# Patient Record
Sex: FEMALE | Race: ASIAN | NOT HISPANIC OR LATINO
[De-identification: names, ages, dates, MRNs, and addresses within clinical notes are randomized per-mention and may not be internally consistent; named-entity substitution may affect disease eponyms.]

---

## 2018-01-08 ENCOUNTER — CHARTING TRANS (OUTPATIENT)
Dept: OTHER | Age: 38
End: 2018-01-08

## 2018-06-07 PROBLEM — M77.11 LATERAL EPICONDYLITIS, RIGHT ELBOW: Status: ACTIVE | Noted: 2018-06-07

## 2018-11-19 PROCEDURE — 83520 IMMUNOASSAY QUANT NOS NONAB: CPT | Performed by: OBSTETRICS & GYNECOLOGY

## 2018-11-20 PROBLEM — M77.11 LATERAL EPICONDYLITIS, RIGHT ELBOW: Status: RESOLVED | Noted: 2018-06-07 | Resolved: 2018-11-20

## 2019-03-13 PROCEDURE — 88175 CYTOPATH C/V AUTO FLUID REDO: CPT | Performed by: OBSTETRICS & GYNECOLOGY

## 2019-03-13 PROCEDURE — 87624 HPV HI-RISK TYP POOLED RSLT: CPT | Performed by: OBSTETRICS & GYNECOLOGY

## 2020-05-13 PROBLEM — U07.1 COVID-19 VIRUS INFECTION: Status: ACTIVE | Noted: 2020-05-13

## 2020-05-13 PROBLEM — E78.00 HYPERCHOLESTEROLEMIA: Status: ACTIVE | Noted: 2020-05-13

## 2021-05-11 ENCOUNTER — LAB ENCOUNTER (OUTPATIENT)
Dept: LAB | Age: 41
End: 2021-05-11
Attending: EMERGENCY MEDICINE
Payer: COMMERCIAL

## 2021-05-11 ENCOUNTER — OFFICE VISIT (OUTPATIENT)
Dept: FAMILY MEDICINE CLINIC | Facility: CLINIC | Age: 41
End: 2021-05-11
Payer: COMMERCIAL

## 2021-05-11 VITALS
DIASTOLIC BLOOD PRESSURE: 82 MMHG | OXYGEN SATURATION: 98 % | HEIGHT: 62 IN | TEMPERATURE: 98 F | WEIGHT: 133 LBS | BODY MASS INDEX: 24.48 KG/M2 | RESPIRATION RATE: 15 BRPM | HEART RATE: 80 BPM | SYSTOLIC BLOOD PRESSURE: 122 MMHG

## 2021-05-11 DIAGNOSIS — R73.03 PREDIABETES: ICD-10-CM

## 2021-05-11 DIAGNOSIS — E78.00 HYPERCHOLESTEROLEMIA: ICD-10-CM

## 2021-05-11 DIAGNOSIS — N97.9 INABILITY TO CONCEIVE, FEMALE: ICD-10-CM

## 2021-05-11 DIAGNOSIS — Z13.21 SCREENING FOR ENDOCRINE, NUTRITIONAL, METABOLIC AND IMMUNITY DISORDER: ICD-10-CM

## 2021-05-11 DIAGNOSIS — K21.9 CHRONIC GERD: ICD-10-CM

## 2021-05-11 DIAGNOSIS — Z13.228 SCREENING FOR ENDOCRINE, NUTRITIONAL, METABOLIC AND IMMUNITY DISORDER: ICD-10-CM

## 2021-05-11 DIAGNOSIS — Z00.00 ENCOUNTER FOR ANNUAL PHYSICAL EXAMINATION EXCLUDING GYNECOLOGICAL EXAMINATION IN A PATIENT OLDER THAN 17 YEARS: Primary | ICD-10-CM

## 2021-05-11 DIAGNOSIS — Z13.0 SCREENING FOR ENDOCRINE, NUTRITIONAL, METABOLIC AND IMMUNITY DISORDER: ICD-10-CM

## 2021-05-11 DIAGNOSIS — Z12.31 ENCOUNTER FOR SCREENING MAMMOGRAM FOR BREAST CANCER: ICD-10-CM

## 2021-05-11 DIAGNOSIS — Z23 NEED FOR TDAP VACCINATION: ICD-10-CM

## 2021-05-11 DIAGNOSIS — Z13.29 SCREENING FOR ENDOCRINE, NUTRITIONAL, METABOLIC AND IMMUNITY DISORDER: ICD-10-CM

## 2021-05-11 PROCEDURE — 3074F SYST BP LT 130 MM HG: CPT | Performed by: EMERGENCY MEDICINE

## 2021-05-11 PROCEDURE — 99386 PREV VISIT NEW AGE 40-64: CPT | Performed by: EMERGENCY MEDICINE

## 2021-05-11 PROCEDURE — 90471 IMMUNIZATION ADMIN: CPT | Performed by: EMERGENCY MEDICINE

## 2021-05-11 PROCEDURE — 83036 HEMOGLOBIN GLYCOSYLATED A1C: CPT

## 2021-05-11 PROCEDURE — 90715 TDAP VACCINE 7 YRS/> IM: CPT | Performed by: EMERGENCY MEDICINE

## 2021-05-11 PROCEDURE — 85025 COMPLETE CBC W/AUTO DIFF WBC: CPT

## 2021-05-11 PROCEDURE — 80061 LIPID PANEL: CPT

## 2021-05-11 PROCEDURE — 36415 COLL VENOUS BLD VENIPUNCTURE: CPT

## 2021-05-11 PROCEDURE — 84443 ASSAY THYROID STIM HORMONE: CPT

## 2021-05-11 PROCEDURE — 3008F BODY MASS INDEX DOCD: CPT | Performed by: EMERGENCY MEDICINE

## 2021-05-11 PROCEDURE — 3079F DIAST BP 80-89 MM HG: CPT | Performed by: EMERGENCY MEDICINE

## 2021-05-11 NOTE — PROGRESS NOTES
Zigmund Mcardle is a 38year old female who presents for a complete physical exam.   HPI:     Patient presents with:  Physical: NP, annual physical         Age: 38    1First day of last menstrual period (or first year of         menstruation, if through you  had your cholesterol level checked  in the past five years? NO       e. Have you had a tetanus shot  the past 10 years? NO       f. Does your house have a working smoke detector? YES         g. Do you have firearms at home? YES NO        h.  Have y Diabetes Father    • Hypertension Father    • Diabetes Sister    • Diabetes Brother       Social History:   Social History    Tobacco Use      Smoking status: Never Smoker      Smokeless tobacco: Never Used    Vaping Use      Vaping Use: Never used    Alco nerves II-XII normal,no focal abnormalities, and reflexes coordination and gait normal and symmetric. Sensation intact. Extremities: are symmetric with no cyanosis, clubbing, or edema. MS: Normal muscles tones, no joints abnormalities.   SKIN: Normal color and agrees to the plan. The patient is asked  to return yearly for annual preventative health exam.    Well balanced diet recommended. Routine exercise recommended most days during the week.   Wear sunscreen - SPF 15 or higher and reapply every 2 hours

## 2021-05-11 NOTE — PATIENT INSTRUCTIONS
Thank you for choosing [facility]  To Do:  FOR [facility]        1. Have blood tests  2. Arrange for mammogram  3. Follow up yearly or as needed  4. Have blood tests done  5. Continue with Nexium daily  6.  Follow up with Gi for possible end Other sources      Source   Calcium (mg) per serving   Source   Calcium (mg) per serving   Source   Calcium (mg) per serving      Low-fat yogurt, plain   415 mg/8 oz.   Sardines, Atlantic, canned, with bones   351 mg/3 oz.   Oatmeal, instant, fortified   2 3 years   Alcohol misuse All women in this age group At routine exams   Blood pressure All women in this age group Every 2 years if your blood pressure is less than 120/80 mm Hg; yearly if your systolic blood pressure is 120 to 139 mm Hg, or your diastolic dose   Hepatitis A Women at increased risk for infection – talk with your healthcare provider 2 doses given 6 months apart   Hepatitis B Women at increased risk for infection – talk with your healthcare provider 3 doses over 6 months; second dose should be Last Reviewed: 8/27/2015  © 6653-8602 [address], [address] [place], [place] [address]. All rights reserved. This information is not intended as a substitute for professional medical care.  Always follow your healthcare professional's in

## 2021-05-12 ENCOUNTER — TELEPHONE (OUTPATIENT)
Dept: FAMILY MEDICINE CLINIC | Facility: CLINIC | Age: 41
End: 2021-05-12

## 2021-05-12 NOTE — TELEPHONE ENCOUNTER
----- Message from Alcon Levine MD sent at 5/11/2021  5:45 PM CDT -----  Needs OV for discussion of elevated HBA1C and cholesterol

## 2021-05-25 ENCOUNTER — OFFICE VISIT (OUTPATIENT)
Dept: FAMILY MEDICINE CLINIC | Facility: CLINIC | Age: 41
End: 2021-05-25
Payer: COMMERCIAL

## 2021-05-25 VITALS
HEART RATE: 83 BPM | RESPIRATION RATE: 15 BRPM | OXYGEN SATURATION: 98 % | TEMPERATURE: 98 F | DIASTOLIC BLOOD PRESSURE: 78 MMHG | BODY MASS INDEX: 24 KG/M2 | WEIGHT: 132 LBS | SYSTOLIC BLOOD PRESSURE: 120 MMHG

## 2021-05-25 DIAGNOSIS — E78.00 HYPERCHOLESTEROLEMIA: Primary | ICD-10-CM

## 2021-05-25 DIAGNOSIS — R73.03 PREDIABETES: ICD-10-CM

## 2021-05-25 PROCEDURE — 3078F DIAST BP <80 MM HG: CPT | Performed by: EMERGENCY MEDICINE

## 2021-05-25 PROCEDURE — 99213 OFFICE O/P EST LOW 20 MIN: CPT | Performed by: EMERGENCY MEDICINE

## 2021-05-25 PROCEDURE — 3074F SYST BP LT 130 MM HG: CPT | Performed by: EMERGENCY MEDICINE

## 2021-05-25 NOTE — PROGRESS NOTES
Chief Complaint:   Patient presents with:  Cholesterol: F/u     HPI:   This is a 38year old female         Patient here for review of laboratories. Recent labs showed cholesterol that was high with LDL elevated 171. Hemoglobin A1c 6.7.   Denies any his groomed.   GENERAL: well developed, well nourished, well hydrated, no distress  SKIN: good skin turgor, no obvious rashes      Recent Results (from the past 672 hour(s))   TSH W REFLEX TO FREE T4    Collection Time: 05/11/21 11:01 AM   Result Value Ref Laverne Cartagena improving her dietary choices. Refuses referral to dietitian for nutritional guidance. Encouraged weight loss and regular exercise. Patient instructed to follow-up in 3 months for recheck. Repeat blood test in 3 months.   Follow-up sooner if with any ot

## 2021-05-25 NOTE — PATIENT INSTRUCTIONS
Thank you for choosing [facility]  To Do:  FOR Carol DODD        1. Follow up in 3 months  2.  Repeat blood linda in 3 months                      High Cholesterol: Assessing Your Risk  High cholesterol level is one of the big risk factor f old or older? · Blood pressure. Do you have high blood pressure? Do you take medicine to treat high blood pressure? · Cholesterol levels. Have you been told you have high cholesterol? Do you take medicine to control your cholesterol?  Have your LDL-C (\"b your lifetime risk if you are age 21 to 44. Depending on these results, your provider may talk with you about other conditions that can influence your risk and affect your treatment decisions.  The following things can affect your risk:  · A family have a LDL (“bad cholesterol”). Cholesterol test results are most often shown as the total of HDL and LDL cholesterol numbers. You may also be told the separate HDL and LDL cholesterol results.   The optimal level of LDL has changed over time and depends on your r for professional medical care. Always follow your healthcare professional's instructions. Understanding Your Cholesterol Numbers     Blood flows easily when arteries are clear. Less blood flows when cholesterol builds up in artery walls.    The for health problems. My total cholesterol is: ________________   Your lipid numbers  Total cholesterol is just one part of the story. Cholesterol is made up of different kinds of fats (lipids).  If your total cholesterol is high, knowing your lipid profile peripheral artery disease. If your cholesterol levels are higher than normal, your healthcare provider will help you with steps to take to lower your levels. Steps may include lifestyle changes such as diet, physical activity, and quitting smoking.  Your pr changes you may need to make. Your provider may recommend that you see a registered dietitian for help with diet changes. Changes may include:   · Cutting back on the amount of fat and cholesterol in your meals  · Eating less salt (sodium).  This is especia more easily than adding exercise. Quitting smoking  Smoking and other tobacco use can raise cholesterol and make it harder to control. Quitting is tough. But millions of people have given up tobacco for good. You can quit, too!  Think about some of the re groups  Some people may need to take medicines called statins to control their cholesterol. This is in addition to eating a healthy diet and getting regular exercise. The dose or intensity of the statin depends on your risk factors.    Statins can help you [address]. All rights reserved. This information is not intended as a substitute for professional medical care. Always follow your healthcare professional's instructions. Controlling Your Cholesterol  Cholesterol is a waxy substance.  It travels i such as salmon, herring, sardines or mackerel per week . Most fish contain omega-3 fatty acids. These help lower total blood cholesterol. Omega-3 fatty acids lowers triglyceride levels, another form of fat in the blood.  If you are pregnant or thinking of b instructions. Prediabetes  You have been diagnosed with prediabetes. This means that the level of sugar (glucose) in your blood is too high. If you have prediabetes, you are at risk for developing type 2 diabetes.  Type 2 diabetes is diagnosed when t or lower. Prediabetes is 100 mg/dL to 125 mg/dL. Diabetes is 126 mg/dL or higher. · Glucose tolerance test. Your blood sugar is measured before and after you drink a very sugary liquid.  A normal test result is 139 milligrams per deciliter (mg/dL) or lower the time  · Have to urinate often  · Lose weight for no reason  · Feel numbness or tingling in your fingers or toes  · Have cuts or bruises that don’t seem to heal  · Have blurry vision  Linda last reviewed this educational content on 6/1/2019  © 2000-2 high in fiber can help you keep your cholesterol down.  Good sources of fiber are:  · Oats  · Barley  · Whole grains  · Beans  · Vegetables  · Cornmeal  · Popcorn  · Berries, apples, and other fruits  Linda last reviewed this educational content on 11/1/ stop what you’re doing, take your nitroglycerin, and call your doctor. Linda last reviewed this educational content on 6/1/2019  © 7661-2744 [employer] [address]. All rights reserved.  This information is not intended as a substitute for profession

## 2022-01-04 ENCOUNTER — APPOINTMENT (OUTPATIENT)
Dept: OTHER | Facility: HOSPITAL | Age: 42
End: 2022-01-04
Attending: PREVENTIVE MEDICINE

## 2022-05-20 ENCOUNTER — OFFICE VISIT (OUTPATIENT)
Dept: FAMILY MEDICINE CLINIC | Facility: CLINIC | Age: 42
End: 2022-05-20
Payer: COMMERCIAL

## 2022-05-20 VITALS
DIASTOLIC BLOOD PRESSURE: 94 MMHG | OXYGEN SATURATION: 99 % | BODY MASS INDEX: 23.92 KG/M2 | SYSTOLIC BLOOD PRESSURE: 149 MMHG | WEIGHT: 135 LBS | RESPIRATION RATE: 18 BRPM | HEART RATE: 96 BPM | HEIGHT: 63 IN | TEMPERATURE: 98 F

## 2022-05-20 DIAGNOSIS — J01.40 ACUTE PANSINUSITIS, RECURRENCE NOT SPECIFIED: Primary | ICD-10-CM

## 2022-05-20 PROCEDURE — 3080F DIAST BP >= 90 MM HG: CPT | Performed by: PHYSICIAN ASSISTANT

## 2022-05-20 PROCEDURE — 3077F SYST BP >= 140 MM HG: CPT | Performed by: PHYSICIAN ASSISTANT

## 2022-05-20 PROCEDURE — 99213 OFFICE O/P EST LOW 20 MIN: CPT | Performed by: PHYSICIAN ASSISTANT

## 2022-05-20 PROCEDURE — 3008F BODY MASS INDEX DOCD: CPT | Performed by: PHYSICIAN ASSISTANT

## 2022-05-20 RX ORDER — FLUTICASONE PROPIONATE 50 MCG
2 SPRAY, SUSPENSION (ML) NASAL DAILY
Qty: 1 EACH | Refills: 0 | Status: SHIPPED | OUTPATIENT
Start: 2022-05-20 | End: 2022-05-20

## 2022-05-20 RX ORDER — FLUTICASONE PROPIONATE 50 MCG
SPRAY, SUSPENSION (ML) NASAL
Qty: 48 G | Refills: 0 | Status: SHIPPED | OUTPATIENT
Start: 2022-05-20

## 2022-05-20 RX ORDER — CEFDINIR 300 MG/1
300 CAPSULE ORAL 2 TIMES DAILY
Qty: 14 CAPSULE | Refills: 0 | Status: SHIPPED | OUTPATIENT
Start: 2022-05-20 | End: 2022-05-27

## 2022-06-14 ENCOUNTER — OFFICE VISIT (OUTPATIENT)
Dept: FAMILY MEDICINE CLINIC | Facility: CLINIC | Age: 42
End: 2022-06-14
Payer: COMMERCIAL

## 2022-06-14 ENCOUNTER — LAB ENCOUNTER (OUTPATIENT)
Dept: LAB | Age: 42
End: 2022-06-14
Attending: EMERGENCY MEDICINE
Payer: COMMERCIAL

## 2022-06-14 VITALS
SYSTOLIC BLOOD PRESSURE: 114 MMHG | BODY MASS INDEX: 24.1 KG/M2 | WEIGHT: 136 LBS | HEART RATE: 88 BPM | RESPIRATION RATE: 15 BRPM | OXYGEN SATURATION: 97 % | DIASTOLIC BLOOD PRESSURE: 82 MMHG | HEIGHT: 63 IN

## 2022-06-14 DIAGNOSIS — Z00.00 LABORATORY EXAMINATION ORDERED AS PART OF A ROUTINE GENERAL MEDICAL EXAMINATION: ICD-10-CM

## 2022-06-14 DIAGNOSIS — K21.9 CHRONIC GERD: ICD-10-CM

## 2022-06-14 DIAGNOSIS — Z12.31 ENCOUNTER FOR SCREENING MAMMOGRAM FOR MALIGNANT NEOPLASM OF BREAST: ICD-10-CM

## 2022-06-14 DIAGNOSIS — E04.1 THYROID NODULE: ICD-10-CM

## 2022-06-14 DIAGNOSIS — Z00.00 ENCOUNTER FOR ANNUAL PHYSICAL EXAMINATION EXCLUDING GYNECOLOGICAL EXAMINATION IN A PATIENT OLDER THAN 17 YEARS: Primary | ICD-10-CM

## 2022-06-14 LAB
ALBUMIN SERPL-MCNC: 3.8 G/DL (ref 3.4–5)
ALBUMIN/GLOB SERPL: 1.1 {RATIO} (ref 1–2)
ALP LIVER SERPL-CCNC: 70 U/L
ALT SERPL-CCNC: 32 U/L
ANION GAP SERPL CALC-SCNC: 8 MMOL/L (ref 0–18)
AST SERPL-CCNC: 26 U/L (ref 15–37)
BASOPHILS # BLD AUTO: 0.06 X10(3) UL (ref 0–0.2)
BASOPHILS NFR BLD AUTO: 0.9 %
BILIRUB SERPL-MCNC: 0.5 MG/DL (ref 0.1–2)
BUN BLD-MCNC: 10 MG/DL (ref 7–18)
CALCIUM BLD-MCNC: 9.1 MG/DL (ref 8.5–10.1)
CHLORIDE SERPL-SCNC: 108 MMOL/L (ref 98–112)
CHOLEST SERPL-MCNC: 255 MG/DL (ref ?–200)
CO2 SERPL-SCNC: 22 MMOL/L (ref 21–32)
CREAT BLD-MCNC: 0.77 MG/DL
EOSINOPHIL # BLD AUTO: 0.11 X10(3) UL (ref 0–0.7)
EOSINOPHIL NFR BLD AUTO: 1.6 %
ERYTHROCYTE [DISTWIDTH] IN BLOOD BY AUTOMATED COUNT: 12.6 %
FASTING PATIENT LIPID ANSWER: YES
FASTING STATUS PATIENT QL REPORTED: YES
GLOBULIN PLAS-MCNC: 3.5 G/DL (ref 2.8–4.4)
GLUCOSE BLD-MCNC: 100 MG/DL (ref 70–99)
HCT VFR BLD AUTO: 40.9 %
HDLC SERPL-MCNC: 70 MG/DL (ref 40–59)
HGB BLD-MCNC: 13.2 G/DL
IMM GRANULOCYTES # BLD AUTO: 0.02 X10(3) UL (ref 0–1)
IMM GRANULOCYTES NFR BLD: 0.3 %
LDLC SERPL CALC-MCNC: 170 MG/DL (ref ?–100)
LYMPHOCYTES # BLD AUTO: 1.8 X10(3) UL (ref 1–4)
LYMPHOCYTES NFR BLD AUTO: 26.7 %
MCH RBC QN AUTO: 30.3 PG (ref 26–34)
MCHC RBC AUTO-ENTMCNC: 32.3 G/DL (ref 31–37)
MCV RBC AUTO: 93.8 FL
MONOCYTES # BLD AUTO: 0.51 X10(3) UL (ref 0.1–1)
MONOCYTES NFR BLD AUTO: 7.6 %
NEUTROPHILS # BLD AUTO: 4.24 X10 (3) UL (ref 1.5–7.7)
NEUTROPHILS # BLD AUTO: 4.24 X10(3) UL (ref 1.5–7.7)
NEUTROPHILS NFR BLD AUTO: 62.9 %
NONHDLC SERPL-MCNC: 185 MG/DL (ref ?–130)
OSMOLALITY SERPL CALC.SUM OF ELEC: 285 MOSM/KG (ref 275–295)
PLATELET # BLD AUTO: 339 10(3)UL (ref 150–450)
POTASSIUM SERPL-SCNC: 3.7 MMOL/L (ref 3.5–5.1)
PROT SERPL-MCNC: 7.3 G/DL (ref 6.4–8.2)
RBC # BLD AUTO: 4.36 X10(6)UL
SODIUM SERPL-SCNC: 138 MMOL/L (ref 136–145)
TRIGL SERPL-MCNC: 90 MG/DL (ref 30–149)
TSI SER-ACNC: 0.68 MIU/ML (ref 0.36–3.74)
VLDLC SERPL CALC-MCNC: 18 MG/DL (ref 0–30)
WBC # BLD AUTO: 6.7 X10(3) UL (ref 4–11)

## 2022-06-14 PROCEDURE — 36415 COLL VENOUS BLD VENIPUNCTURE: CPT

## 2022-06-14 PROCEDURE — 3074F SYST BP LT 130 MM HG: CPT | Performed by: EMERGENCY MEDICINE

## 2022-06-14 PROCEDURE — 84443 ASSAY THYROID STIM HORMONE: CPT

## 2022-06-14 PROCEDURE — 3008F BODY MASS INDEX DOCD: CPT | Performed by: EMERGENCY MEDICINE

## 2022-06-14 PROCEDURE — 99396 PREV VISIT EST AGE 40-64: CPT | Performed by: EMERGENCY MEDICINE

## 2022-06-14 PROCEDURE — 80061 LIPID PANEL: CPT

## 2022-06-14 PROCEDURE — 85025 COMPLETE CBC W/AUTO DIFF WBC: CPT

## 2022-06-14 PROCEDURE — 80053 COMPREHEN METABOLIC PANEL: CPT

## 2022-06-14 PROCEDURE — 3079F DIAST BP 80-89 MM HG: CPT | Performed by: EMERGENCY MEDICINE

## 2022-06-15 ENCOUNTER — TELEPHONE (OUTPATIENT)
Dept: FAMILY MEDICINE CLINIC | Facility: CLINIC | Age: 42
End: 2022-06-15

## 2022-06-15 NOTE — TELEPHONE ENCOUNTER
----- Message from Jossie Coley MD sent at 6/15/2022  9:16 AM CDT -----  Total cholesterol and LDL elevated. Please  on dietary changes with low cholesterol low fat diet. Also increase physical activity participate in regular exercise.       Rest of labs stable

## 2022-07-27 ENCOUNTER — HOSPITAL ENCOUNTER (OUTPATIENT)
Dept: ULTRASOUND IMAGING | Age: 42
Discharge: HOME | End: 2022-07-27
Attending: EMERGENCY MEDICINE
Payer: COMMERCIAL

## 2022-07-27 DIAGNOSIS — E04.1 THYROID NODULE: ICD-10-CM

## 2022-07-27 PROCEDURE — 76536 US EXAM OF HEAD AND NECK: CPT | Performed by: EMERGENCY MEDICINE

## 2022-07-28 ENCOUNTER — TELEPHONE (OUTPATIENT)
Dept: FAMILY MEDICINE CLINIC | Facility: CLINIC | Age: 42
End: 2022-07-28

## 2022-07-28 DIAGNOSIS — E04.1 THYROID NODULE: Primary | ICD-10-CM

## 2022-07-28 NOTE — TELEPHONE ENCOUNTER
----- Message from GRANT Benavides sent at 7/28/2022 10:15 AM CDT -----  Referral to see ENT for further evaluation    mychart to pt and referral in system

## 2022-10-28 ENCOUNTER — LAB ENCOUNTER (OUTPATIENT)
Dept: LAB | Age: 42
End: 2022-10-28
Attending: INTERNAL MEDICINE
Payer: COMMERCIAL

## 2022-10-28 DIAGNOSIS — Z01.818 PRE-OP TESTING: ICD-10-CM

## 2022-10-29 LAB — SARS-COV-2 RNA RESP QL NAA+PROBE: NOT DETECTED

## 2022-10-31 PROBLEM — K22.9 IRREGULAR Z LINE OF ESOPHAGUS: Status: ACTIVE | Noted: 2022-10-31

## 2022-10-31 PROBLEM — K21.00 GERD WITH ESOPHAGITIS: Status: ACTIVE | Noted: 2022-10-31

## 2022-10-31 PROBLEM — R12 CHRONIC HEARTBURN: Status: ACTIVE | Noted: 2022-10-31

## 2023-02-02 ENCOUNTER — HOSPITAL ENCOUNTER (OUTPATIENT)
Facility: HOSPITAL | Age: 43
Setting detail: OBSERVATION
Discharge: HOME | End: 2023-02-03
Attending: EMERGENCY MEDICINE | Admitting: SURGERY
Payer: COMMERCIAL

## 2023-02-02 ENCOUNTER — OFFICE VISIT (OUTPATIENT)
Dept: FAMILY MEDICINE CLINIC | Facility: CLINIC | Age: 43
End: 2023-02-02
Payer: COMMERCIAL

## 2023-02-02 ENCOUNTER — ANESTHESIA (OUTPATIENT)
Dept: SURGERY | Facility: HOSPITAL | Age: 43
End: 2023-02-02
Payer: COMMERCIAL

## 2023-02-02 ENCOUNTER — APPOINTMENT (OUTPATIENT)
Dept: CT IMAGING | Age: 43
End: 2023-02-02
Attending: EMERGENCY MEDICINE
Payer: COMMERCIAL

## 2023-02-02 ENCOUNTER — ANESTHESIA EVENT (OUTPATIENT)
Dept: SURGERY | Facility: HOSPITAL | Age: 43
End: 2023-02-02
Payer: COMMERCIAL

## 2023-02-02 VITALS
HEIGHT: 63 IN | SYSTOLIC BLOOD PRESSURE: 144 MMHG | TEMPERATURE: 98 F | WEIGHT: 135 LBS | BODY MASS INDEX: 23.92 KG/M2 | HEART RATE: 99 BPM | OXYGEN SATURATION: 100 % | RESPIRATION RATE: 16 BRPM | DIASTOLIC BLOOD PRESSURE: 86 MMHG

## 2023-02-02 DIAGNOSIS — R10.30 LOWER ABDOMINAL PAIN: ICD-10-CM

## 2023-02-02 DIAGNOSIS — K35.80 ACUTE APPENDICITIS, UNSPECIFIED ACUTE APPENDICITIS TYPE: Primary | ICD-10-CM

## 2023-02-02 DIAGNOSIS — K37 APPENDICITIS: ICD-10-CM

## 2023-02-02 DIAGNOSIS — R39.9 UTI SYMPTOMS: Primary | ICD-10-CM

## 2023-02-02 LAB
ALBUMIN SERPL-MCNC: 4.2 G/DL (ref 3.4–5)
ALBUMIN/GLOB SERPL: 1.1 {RATIO} (ref 1–2)
ALP LIVER SERPL-CCNC: 96 U/L
ALT SERPL-CCNC: 28 U/L
ANION GAP SERPL CALC-SCNC: 6 MMOL/L (ref 0–18)
APPEARANCE: NORMAL
AST SERPL-CCNC: 12 U/L (ref 15–37)
B-HCG UR QL: NEGATIVE
BASOPHILS # BLD AUTO: 0.04 X10(3) UL (ref 0–0.2)
BASOPHILS NFR BLD AUTO: 0.3 %
BILIRUB SERPL-MCNC: 0.5 MG/DL (ref 0.1–2)
BILIRUB UR QL STRIP.AUTO: NEGATIVE
BILIRUBIN: NEGATIVE
BUN BLD-MCNC: 9 MG/DL (ref 7–18)
CALCIUM BLD-MCNC: 9.1 MG/DL (ref 8.5–10.1)
CHLORIDE SERPL-SCNC: 105 MMOL/L (ref 98–112)
CLARITY UR REFRACT.AUTO: CLEAR
CO2 SERPL-SCNC: 28 MMOL/L (ref 21–32)
CREAT BLD-MCNC: 0.78 MG/DL
EOSINOPHIL # BLD AUTO: 0.16 X10(3) UL (ref 0–0.7)
EOSINOPHIL NFR BLD AUTO: 1.2 %
ERYTHROCYTE [DISTWIDTH] IN BLOOD BY AUTOMATED COUNT: 12.5 %
GFR SERPLBLD BASED ON 1.73 SQ M-ARVRAT: 97 ML/MIN/1.73M2 (ref 60–?)
GLOBULIN PLAS-MCNC: 3.9 G/DL (ref 2.8–4.4)
GLUCOSE (URINE DIPSTICK): 100 MG/DL
GLUCOSE BLD-MCNC: 135 MG/DL (ref 70–99)
GLUCOSE UR STRIP.AUTO-MCNC: NEGATIVE MG/DL
HCT VFR BLD AUTO: 41.3 %
HGB BLD-MCNC: 13.7 G/DL
IMM GRANULOCYTES # BLD AUTO: 0.04 X10(3) UL (ref 0–1)
IMM GRANULOCYTES NFR BLD: 0.3 %
KETONES (URINE DIPSTICK): NEGATIVE MG/DL
KETONES UR STRIP.AUTO-MCNC: NEGATIVE MG/DL
LEUKOCYTE ESTERASE UR QL STRIP.AUTO: NEGATIVE
LEUKOCYTES: NEGATIVE
LYMPHOCYTES # BLD AUTO: 1.46 X10(3) UL (ref 1–4)
LYMPHOCYTES NFR BLD AUTO: 11.3 %
MCH RBC QN AUTO: 29.7 PG (ref 26–34)
MCHC RBC AUTO-ENTMCNC: 33.2 G/DL (ref 31–37)
MCV RBC AUTO: 89.4 FL
MONOCYTES # BLD AUTO: 0.69 X10(3) UL (ref 0.1–1)
MONOCYTES NFR BLD AUTO: 5.3 %
MULTISTIX LOT#: ABNORMAL NUMERIC
NEUTROPHILS # BLD AUTO: 10.56 X10 (3) UL (ref 1.5–7.7)
NEUTROPHILS # BLD AUTO: 10.56 X10(3) UL (ref 1.5–7.7)
NEUTROPHILS NFR BLD AUTO: 81.6 %
NITRITE UR QL STRIP.AUTO: NEGATIVE
NITRITE, URINE: NEGATIVE
OCCULT BLOOD: NEGATIVE
OSMOLALITY SERPL CALC.SUM OF ELEC: 289 MOSM/KG (ref 275–295)
PH UR STRIP.AUTO: 6 [PH] (ref 5–8)
PH, URINE: 6 (ref 4.5–8)
PLATELET # BLD AUTO: 356 10(3)UL (ref 150–450)
POTASSIUM SERPL-SCNC: 3.5 MMOL/L (ref 3.5–5.1)
PROT SERPL-MCNC: 8.1 G/DL (ref 6.4–8.2)
PROT UR STRIP.AUTO-MCNC: NEGATIVE MG/DL
PROTEIN (URINE DIPSTICK): NEGATIVE MG/DL
RBC # BLD AUTO: 4.62 X10(6)UL
RBC UR QL AUTO: NEGATIVE
SARS-COV-2 RNA RESP QL NAA+PROBE: NOT DETECTED
SODIUM SERPL-SCNC: 139 MMOL/L (ref 136–145)
SP GR UR STRIP.AUTO: <=1.005 (ref 1–1.03)
SPECIFIC GRAVITY: 1.01 (ref 1–1.03)
URINE-COLOR: YELLOW
UROBILINOGEN UR STRIP.AUTO-MCNC: 0.2 MG/DL
UROBILINOGEN,SEMI-QN: 0.2 MG/DL (ref 0–1.9)
WBC # BLD AUTO: 13 X10(3) UL (ref 4–11)

## 2023-02-02 PROCEDURE — 81003 URINALYSIS AUTO W/O SCOPE: CPT | Performed by: PHYSICIAN ASSISTANT

## 2023-02-02 PROCEDURE — 74177 CT ABD & PELVIS W/CONTRAST: CPT | Performed by: EMERGENCY MEDICINE

## 2023-02-02 PROCEDURE — 99213 OFFICE O/P EST LOW 20 MIN: CPT | Performed by: PHYSICIAN ASSISTANT

## 2023-02-02 PROCEDURE — 3079F DIAST BP 80-89 MM HG: CPT | Performed by: PHYSICIAN ASSISTANT

## 2023-02-02 PROCEDURE — 3008F BODY MASS INDEX DOCD: CPT | Performed by: PHYSICIAN ASSISTANT

## 2023-02-02 PROCEDURE — 99244 OFF/OP CNSLTJ NEW/EST MOD 40: CPT | Performed by: SURGERY

## 2023-02-02 PROCEDURE — 3077F SYST BP >= 140 MM HG: CPT | Performed by: PHYSICIAN ASSISTANT

## 2023-02-02 RX ORDER — IOHEXOL 350 MG/ML
80 INJECTION, SOLUTION INTRAVENOUS
Status: COMPLETED | OUTPATIENT
Start: 2023-02-02 | End: 2023-02-02

## 2023-02-02 RX ORDER — HEPARIN SODIUM 5000 [USP'U]/ML
5000 INJECTION, SOLUTION INTRAVENOUS; SUBCUTANEOUS ONCE
Status: COMPLETED | OUTPATIENT
Start: 2023-02-02 | End: 2023-02-02

## 2023-02-02 RX ORDER — ROCURONIUM BROMIDE 10 MG/ML
INJECTION, SOLUTION INTRAVENOUS AS NEEDED
Status: DISCONTINUED | OUTPATIENT
Start: 2023-02-02 | End: 2023-02-03 | Stop reason: SURG

## 2023-02-02 RX ORDER — ACETAMINOPHEN 500 MG
1000 TABLET ORAL ONCE
Status: DISCONTINUED | OUTPATIENT
Start: 2023-02-02 | End: 2023-02-03 | Stop reason: HOSPADM

## 2023-02-02 RX ORDER — CEFOXITIN 2 G/1
INJECTION, POWDER, FOR SOLUTION INTRAVENOUS
Status: DISPENSED
Start: 2023-02-02 | End: 2023-02-03

## 2023-02-02 RX ORDER — 0.9 % SODIUM CHLORIDE 0.9 %
INTRAVENOUS SOLUTION INTRAVENOUS
Status: DISPENSED
Start: 2023-02-02 | End: 2023-02-03

## 2023-02-02 RX ORDER — SCOLOPAMINE TRANSDERMAL SYSTEM 1 MG/1
1 PATCH, EXTENDED RELEASE TRANSDERMAL ONCE
Status: DISCONTINUED | OUTPATIENT
Start: 2023-02-02 | End: 2023-02-03 | Stop reason: HOSPADM

## 2023-02-02 RX ORDER — HEPARIN SODIUM 5000 [USP'U]/ML
INJECTION, SOLUTION INTRAVENOUS; SUBCUTANEOUS
Status: COMPLETED
Start: 2023-02-02 | End: 2023-02-02

## 2023-02-02 RX ORDER — SODIUM CHLORIDE, SODIUM LACTATE, POTASSIUM CHLORIDE, CALCIUM CHLORIDE 600; 310; 30; 20 MG/100ML; MG/100ML; MG/100ML; MG/100ML
INJECTION, SOLUTION INTRAVENOUS CONTINUOUS
Status: DISCONTINUED | OUTPATIENT
Start: 2023-02-02 | End: 2023-02-03

## 2023-02-02 RX ORDER — METOCLOPRAMIDE HYDROCHLORIDE 5 MG/ML
INJECTION INTRAMUSCULAR; INTRAVENOUS AS NEEDED
Status: DISCONTINUED | OUTPATIENT
Start: 2023-02-02 | End: 2023-02-03 | Stop reason: SURG

## 2023-02-02 RX ORDER — MIDAZOLAM HYDROCHLORIDE 1 MG/ML
INJECTION INTRAMUSCULAR; INTRAVENOUS AS NEEDED
Status: DISCONTINUED | OUTPATIENT
Start: 2023-02-02 | End: 2023-02-03 | Stop reason: SURG

## 2023-02-02 RX ADMIN — ROCURONIUM BROMIDE 50 MG: 10 INJECTION, SOLUTION INTRAVENOUS at 23:18:00

## 2023-02-02 RX ADMIN — MIDAZOLAM HYDROCHLORIDE 2 MG: 1 INJECTION INTRAMUSCULAR; INTRAVENOUS at 23:14:00

## 2023-02-02 RX ADMIN — METOCLOPRAMIDE HYDROCHLORIDE 10 MG: 5 INJECTION INTRAMUSCULAR; INTRAVENOUS at 23:35:00

## 2023-02-02 NOTE — ED QUICK NOTES
Orders for admission, patient is aware of plan and ready to go upstairs. Any questions, please call ED RN Remy Degree at extension [phone]. Vaccinated?  yesType of COVID test sent: rapid  COVID Suspicion level: Low      Titratable drug(s) infusing:  Rate:    LOC at time of transport:  0x3  Other pertinent information: Pt had a piece of bread and sips of water at 10am    CIWA score=  NIH score=

## 2023-02-02 NOTE — PROGRESS NOTES
Report for consultation requested from the ER physician. Patient with diffuse abdominal pain now localized to the right lower quadrant. CT scan with prominent appendix concerning for early acute appendicitis. I reviewed the images I believe there is appendicitis on these images. Recommend laparoscopic appendectomy later tonight. Full consult to follow. Azalia Ly.  MD Nia [facility] General Surgery

## 2023-02-03 VITALS
WEIGHT: 132.25 LBS | TEMPERATURE: 98 F | BODY MASS INDEX: 23.43 KG/M2 | HEART RATE: 87 BPM | SYSTOLIC BLOOD PRESSURE: 124 MMHG | OXYGEN SATURATION: 96 % | HEIGHT: 63 IN | DIASTOLIC BLOOD PRESSURE: 72 MMHG | RESPIRATION RATE: 18 BRPM

## 2023-02-03 PROCEDURE — 44970 LAPAROSCOPY APPENDECTOMY: CPT | Performed by: SURGERY

## 2023-02-03 PROCEDURE — 0DTJ4ZZ RESECTION OF APPENDIX, PERCUTANEOUS ENDOSCOPIC APPROACH: ICD-10-PCS | Performed by: SURGERY

## 2023-02-03 RX ORDER — HYDROCODONE BITARTRATE AND ACETAMINOPHEN 5; 325 MG/1; MG/1
1 TABLET ORAL ONCE AS NEEDED
Status: DISCONTINUED | OUTPATIENT
Start: 2023-02-03 | End: 2023-02-03 | Stop reason: HOSPADM

## 2023-02-03 RX ORDER — SODIUM CHLORIDE, SODIUM LACTATE, POTASSIUM CHLORIDE, CALCIUM CHLORIDE 600; 310; 30; 20 MG/100ML; MG/100ML; MG/100ML; MG/100ML
INJECTION, SOLUTION INTRAVENOUS CONTINUOUS
Status: DISCONTINUED | OUTPATIENT
Start: 2023-02-03 | End: 2023-02-03 | Stop reason: HOSPADM

## 2023-02-03 RX ORDER — KETOROLAC TROMETHAMINE 30 MG/ML
INJECTION, SOLUTION INTRAMUSCULAR; INTRAVENOUS AS NEEDED
Status: DISCONTINUED | OUTPATIENT
Start: 2023-02-03 | End: 2023-02-03 | Stop reason: SURG

## 2023-02-03 RX ORDER — HYDROMORPHONE HYDROCHLORIDE 1 MG/ML
0.6 INJECTION, SOLUTION INTRAMUSCULAR; INTRAVENOUS; SUBCUTANEOUS EVERY 5 MIN PRN
Status: DISCONTINUED | OUTPATIENT
Start: 2023-02-03 | End: 2023-02-03 | Stop reason: HOSPADM

## 2023-02-03 RX ORDER — PROCHLORPERAZINE EDISYLATE 5 MG/ML
5 INJECTION INTRAMUSCULAR; INTRAVENOUS EVERY 8 HOURS PRN
Status: CANCELLED | OUTPATIENT
Start: 2023-02-02

## 2023-02-03 RX ORDER — HYDROMORPHONE HYDROCHLORIDE 1 MG/ML
0.8 INJECTION, SOLUTION INTRAMUSCULAR; INTRAVENOUS; SUBCUTANEOUS EVERY 2 HOUR PRN
Status: DISCONTINUED | OUTPATIENT
Start: 2023-02-03 | End: 2023-02-03

## 2023-02-03 RX ORDER — MIDAZOLAM HYDROCHLORIDE 1 MG/ML
1 INJECTION INTRAMUSCULAR; INTRAVENOUS EVERY 5 MIN PRN
Status: DISCONTINUED | OUTPATIENT
Start: 2023-02-03 | End: 2023-02-03 | Stop reason: HOSPADM

## 2023-02-03 RX ORDER — IBUPROFEN 600 MG/1
600 TABLET ORAL EVERY 6 HOURS SCHEDULED
Status: DISCONTINUED | OUTPATIENT
Start: 2023-02-04 | End: 2023-02-03

## 2023-02-03 RX ORDER — KETOROLAC TROMETHAMINE 30 MG/ML
30 INJECTION, SOLUTION INTRAMUSCULAR; INTRAVENOUS EVERY 6 HOURS
Status: DISCONTINUED | OUTPATIENT
Start: 2023-02-03 | End: 2023-02-03

## 2023-02-03 RX ORDER — HEPARIN SODIUM 5000 [USP'U]/ML
5000 INJECTION, SOLUTION INTRAVENOUS; SUBCUTANEOUS EVERY 12 HOURS SCHEDULED
Status: DISCONTINUED | OUTPATIENT
Start: 2023-02-03 | End: 2023-02-03

## 2023-02-03 RX ORDER — NEOSTIGMINE METHYLSULFATE 1 MG/ML
INJECTION, SOLUTION INTRAVENOUS AS NEEDED
Status: DISCONTINUED | OUTPATIENT
Start: 2023-02-03 | End: 2023-02-03 | Stop reason: SURG

## 2023-02-03 RX ORDER — ONDANSETRON 2 MG/ML
4 INJECTION INTRAMUSCULAR; INTRAVENOUS EVERY 6 HOURS PRN
Status: DISCONTINUED | OUTPATIENT
Start: 2023-02-03 | End: 2023-02-03

## 2023-02-03 RX ORDER — OXYCODONE HYDROCHLORIDE 10 MG/1
10 TABLET ORAL EVERY 4 HOURS PRN
Status: DISCONTINUED | OUTPATIENT
Start: 2023-02-03 | End: 2023-02-03

## 2023-02-03 RX ORDER — ONDANSETRON 2 MG/ML
4 INJECTION INTRAMUSCULAR; INTRAVENOUS EVERY 6 HOURS PRN
Status: DISCONTINUED | OUTPATIENT
Start: 2023-02-03 | End: 2023-02-03 | Stop reason: HOSPADM

## 2023-02-03 RX ORDER — DEXAMETHASONE SODIUM PHOSPHATE 4 MG/ML
VIAL (ML) INJECTION AS NEEDED
Status: DISCONTINUED | OUTPATIENT
Start: 2023-02-03 | End: 2023-02-03 | Stop reason: SURG

## 2023-02-03 RX ORDER — OXYCODONE HYDROCHLORIDE 5 MG/1
5 TABLET ORAL EVERY 4 HOURS PRN
Status: DISCONTINUED | OUTPATIENT
Start: 2023-02-03 | End: 2023-02-03

## 2023-02-03 RX ORDER — DEXTROSE, SODIUM CHLORIDE, AND POTASSIUM CHLORIDE 5; .45; .15 G/100ML; G/100ML; G/100ML
INJECTION INTRAVENOUS CONTINUOUS
Status: DISCONTINUED | OUTPATIENT
Start: 2023-02-03 | End: 2023-02-03

## 2023-02-03 RX ORDER — MEPERIDINE HYDROCHLORIDE 25 MG/ML
25 INJECTION INTRAMUSCULAR; INTRAVENOUS; SUBCUTANEOUS
Status: DISCONTINUED | OUTPATIENT
Start: 2023-02-03 | End: 2023-02-03 | Stop reason: HOSPADM

## 2023-02-03 RX ORDER — PROCHLORPERAZINE EDISYLATE 5 MG/ML
5 INJECTION INTRAMUSCULAR; INTRAVENOUS EVERY 8 HOURS PRN
Status: DISCONTINUED | OUTPATIENT
Start: 2023-02-03 | End: 2023-02-03

## 2023-02-03 RX ORDER — HYDROMORPHONE HYDROCHLORIDE 1 MG/ML
0.4 INJECTION, SOLUTION INTRAMUSCULAR; INTRAVENOUS; SUBCUTANEOUS EVERY 2 HOUR PRN
Status: DISCONTINUED | OUTPATIENT
Start: 2023-02-03 | End: 2023-02-03

## 2023-02-03 RX ORDER — NALOXONE HYDROCHLORIDE 0.4 MG/ML
80 INJECTION, SOLUTION INTRAMUSCULAR; INTRAVENOUS; SUBCUTANEOUS AS NEEDED
Status: DISCONTINUED | OUTPATIENT
Start: 2023-02-03 | End: 2023-02-03 | Stop reason: HOSPADM

## 2023-02-03 RX ORDER — HYDROMORPHONE HYDROCHLORIDE 1 MG/ML
0.2 INJECTION, SOLUTION INTRAMUSCULAR; INTRAVENOUS; SUBCUTANEOUS EVERY 5 MIN PRN
Status: DISCONTINUED | OUTPATIENT
Start: 2023-02-03 | End: 2023-02-03 | Stop reason: HOSPADM

## 2023-02-03 RX ORDER — BUPIVACAINE HYDROCHLORIDE AND EPINEPHRINE 5; 5 MG/ML; UG/ML
INJECTION, SOLUTION EPIDURAL; INTRACAUDAL; PERINEURAL AS NEEDED
Status: DISCONTINUED | OUTPATIENT
Start: 2023-02-03 | End: 2023-02-03 | Stop reason: HOSPADM

## 2023-02-03 RX ORDER — HYDROCODONE BITARTRATE AND ACETAMINOPHEN 5; 325 MG/1; MG/1
2 TABLET ORAL ONCE AS NEEDED
Status: DISCONTINUED | OUTPATIENT
Start: 2023-02-03 | End: 2023-02-03 | Stop reason: HOSPADM

## 2023-02-03 RX ORDER — ACETAMINOPHEN 325 MG/1
650 TABLET ORAL
Status: DISCONTINUED | OUTPATIENT
Start: 2023-02-03 | End: 2023-02-03

## 2023-02-03 RX ORDER — OXYCODONE HYDROCHLORIDE 5 MG/1
5 TABLET ORAL EVERY 6 HOURS PRN
Qty: 8 TABLET | Refills: 0 | Status: SHIPPED | OUTPATIENT
Start: 2023-02-03

## 2023-02-03 RX ORDER — GLYCOPYRROLATE 0.2 MG/ML
INJECTION, SOLUTION INTRAMUSCULAR; INTRAVENOUS AS NEEDED
Status: DISCONTINUED | OUTPATIENT
Start: 2023-02-03 | End: 2023-02-03 | Stop reason: SURG

## 2023-02-03 RX ORDER — HYDROMORPHONE HYDROCHLORIDE 1 MG/ML
0.4 INJECTION, SOLUTION INTRAMUSCULAR; INTRAVENOUS; SUBCUTANEOUS EVERY 5 MIN PRN
Status: DISCONTINUED | OUTPATIENT
Start: 2023-02-03 | End: 2023-02-03 | Stop reason: HOSPADM

## 2023-02-03 RX ORDER — ACETAMINOPHEN 500 MG
1000 TABLET ORAL ONCE AS NEEDED
Status: DISCONTINUED | OUTPATIENT
Start: 2023-02-03 | End: 2023-02-03 | Stop reason: HOSPADM

## 2023-02-03 RX ADMIN — DEXAMETHASONE SODIUM PHOSPHATE 4 MG: 4 MG/ML VIAL (ML) INJECTION at 00:17:00

## 2023-02-03 RX ADMIN — GLYCOPYRROLATE 0.4 MG: 0.2 INJECTION, SOLUTION INTRAMUSCULAR; INTRAVENOUS at 00:17:00

## 2023-02-03 RX ADMIN — NEOSTIGMINE METHYLSULFATE 3 MG: 1 INJECTION, SOLUTION INTRAVENOUS at 00:17:00

## 2023-02-03 RX ADMIN — KETOROLAC TROMETHAMINE 30 MG: 30 INJECTION, SOLUTION INTRAMUSCULAR; INTRAVENOUS at 00:17:00

## 2023-02-03 NOTE — BRIEF OP NOTE
Pre-Operative Diagnosis: Appendicitis [K37]     Post-Operative Diagnosis: Appendicitis [K37]      Procedure Performed:   LAPAROSCOPIC APPENDECTOMY      Surgeon(s) and Role:     * Aaron Kramer MD - Primary    Assistant(s):        Surgical Findings: acutely inflamed appendix     Specimen: appendix     Estimated Blood Loss: Blood Output: 2 mL (2/3/2023 12:11 AM)      Dictation Number:      Veronique Reyes MD  2/3/2023  12:19 AM

## 2023-02-03 NOTE — PLAN OF CARE
A&Ox4. VSS. RA. Denies chest pain and SOB. GI: Abdomen soft, nondistended. Passing gas. Denies nausea. : Voids. Up ad cali. Diet: NPO before surgery. IVF running to gravity from pre-op. All appropriate safety measures in place. All questions and concerns addressed. Pt just got taken down to surgery. Not sure if she is going home or coming back after.

## 2023-02-03 NOTE — PLAN OF CARE
A&Ox4. VSS. RA. . Denies chest pain and SOB. GI: Abdomen soft, nondistended. Denies passing gas. Denies nausea at this time. : Voids. Pain controlled with scheduled pain medications. Up with standby assist.  Incisions: Surgical incisions intact--no drainage. Diet: Tolerating diet. All appropriate safety measures in place. All questions and concerns addressed. Will continue to monitor.      Problem: Patient/Family Goals  Goal: Patient/Family Long Term Goal  Description: Patient's Long Term Goal: Discharge    Interventions:  -Tolerate pain and diet  - See additional Care Plan goals for specific interventions  Outcome: Progressing  Goal: Patient/Family Short Term Goal  Description: Patient's Short Term Goal: Comfort    Interventions:   -PRN and scheduled pain meds  - See additional Care Plan goals for specific interventions  Outcome: Progressing     Problem: PAIN - ADULT  Goal: Verbalizes/displays adequate comfort level or patient's stated pain goal  Description: INTERVENTIONS:  - Encourage pt to monitor pain and request assistance  - Assess pain using appropriate pain scale  - Administer analgesics based on type and severity of pain and evaluate response  - Implement non-pharmacological measures as appropriate and evaluate response  - Consider cultural and social influences on pain and pain management  - Manage/alleviate anxiety  - Utilize distraction and/or relaxation techniques  - Monitor for opioid side effects  - Notify MD/LIP if interventions unsuccessful or patient reports new pain  - Anticipate increased pain with activity and pre-medicate as appropriate  Outcome: Progressing     Problem: RISK FOR INFECTION - ADULT  Goal: Absence of fever/infection during anticipated neutropenic period  Description: INTERVENTIONS  - Monitor WBC  - Administer growth factors as ordered  - Implement neutropenic guidelines  Outcome: Progressing     Problem: SAFETY ADULT - FALL  Goal: Free from fall injury  Description: INTERVENTIONS:  - Assess pt frequently for physical needs  - Identify cognitive and physical deficits and behaviors that affect risk of falls.   - [place] fall precautions as indicated by assessment.  - Educate pt/family on patient safety including physical limitations  - Instruct pt to call for assistance with activity based on assessment  - Modify environment to reduce risk of injury  - Provide assistive devices as appropriate  - Consider OT/PT consult to assist with strengthening/mobility  - Encourage toileting schedule  Outcome: Progressing     Problem: DISCHARGE PLANNING  Goal: Discharge to home or other facility with appropriate resources  Description: INTERVENTIONS:  - Identify barriers to discharge w/pt and caregiver  - Include patient/family/discharge partner in discharge planning  - Arrange for needed discharge resources and transportation as appropriate  - Identify discharge learning needs (meds, wound care, etc)  - Arrange for interpreters to assist at discharge as needed  - Consider post-discharge preferences of patient/family/discharge partner  - Complete POLST form as appropriate  - Assess patient's ability to be responsible for managing their own health  - Refer to Case Management Department for coordinating discharge planning if the patient needs post-hospital services based on physician/LIP order or complex needs related to functional status, cognitive ability or social support system  Outcome: Progressing

## 2023-02-03 NOTE — ANESTHESIA POSTPROCEDURE EVALUATION
[address] Patient Status:  Observation   Age/Gender 36year old female MRN [id number]   Location [facility] Attending Dixon Durán MD   Hosp Day # 0 PCP Bharathi Gill MD       Anesthesia Post-op Note    LAPAROSCOPIC APPENDECTOMY      Procedure Summary     Date: 02/02/23 Room / Location: [facility] OR 10 / 140[facility] OR    Anesthesia Start: 8358 Anesthesia Stop: 02/03/23 0032    Procedure: LAPAROSCOPIC APPENDECTOMY  (Abdomen) Diagnosis:       Appendicitis      (Appendicitis Robyne Burger)    Surgeons: Dixon Durán MD Anesthesiologist: Harshil Flores MD    Anesthesia Type: general ASA Status: 2 - Emergent          Anesthesia Type: general    Vitals Value Taken Time   /72 02/03/23 0032   Temp 98 02/03/23 0032   Pulse 72 02/03/23 0032   Resp 12 02/03/23 0032   SpO2 98 02/03/23 0032       Patient Location: PACU    Anesthesia Type: general    Airway Patency: extubated    Postop Pain Control: adequate    Mental Status: mildly sedated but able to meaningfully participate in the post-anesthesia evaluation    Nausea/Vomiting: none    Cardiopulmonary/Hydration status: stable euvolemic    Complications: no apparent anesthesia related complications    Postop vital signs: stable    Dental Exam: Unchanged from Preop    Patient to be discharged from PACU when criteria met.

## 2023-02-03 NOTE — PROGRESS NOTES
Patient is alert and oriented x3. Up ad cali in hallway. Up to chair for meals. Voiding freely. ABD incisions are CDI. Tolerating diet; denies nausea or vomiting. Pain controlled. Patient assessed and ready for DC home. All instructions given to patient for home. All questions answered to patients level of satisfaction. PIV removed and intact.

## 2023-02-03 NOTE — ADDENDUM NOTE
Addendum  created 02/03/23 0042 by Kelli Gray MD    Order list changed, Pharmacy for encounter modified

## 2023-02-03 NOTE — PLAN OF CARE
Patient admitted via    Oriented to room. Safety precautions initiated. Bed in low position. Call light in reach.

## 2023-02-03 NOTE — ANESTHESIA PROCEDURE NOTES
Airway  Date/Time: 2/2/2023 11:19 PM  Urgency: elective    Airway not difficult    General Information and Staff    Patient location during procedure: OR  Anesthesiologist: Margy Manzano MD  Performed: anesthesiologist     Indications and Patient Condition  Indications for airway management: anesthesia  Spontaneous Ventilation: absent  Sedation level: deep  Preoxygenated: yes  Patient position: sniffing  Mask difficulty assessment: 1 - vent by mask    Final Airway Details  Final airway type: endotracheal airway      Successful airway: ETT  Cuffed: yes   Successful intubation technique: direct laryngoscopy  Endotracheal tube insertion site: oral  Blade: Delilah  Blade size: #3  ETT size (mm): 7.0    Cormack-Lehane Classification: grade IIB - view of arytenoids or posterior of glottis only  Placement verified by: chest auscultation and capnometry   Cuff volume (mL): 8  Measured from: teeth  ETT to teeth (cm): 22  Number of attempts at approach: 1  Number of other approaches attempted: 0

## 2023-02-03 NOTE — OPERATIVE REPORT
PREOPERATIVE DIAGNOSIS: Acute appendicitis. POSTOPERATIVE DIAGNOSIS: Acute appendicitis. PROCEDURE PERFORMED: Laparoscopic appendectomy. ANESTHESIA: General endotracheal anesthesia  Anesthesiologist.: Kamilla Manzano MD  SURGICAL ASSISTANT: OR staff. FINDINGS: Acutely inflamed nonperforated appendix, removed and sent as specimen. SPECIMENS: Appendix. BLOOD LOSS: 2 ml  COMPLICATIONS: None apparent. DISPOSITION: The patient awakened from anesthesia, brought the recovery room in stable condition. She tolerated the procedure without apparent complication. Needle, sponge, and instrument counts were correct at the end of the procedure. Please note, preprocedure antibiotic and DVT prophylaxis were administered per protocol, and a time-out was performed prior to initiation of procedure, identifying the correct patient, procedure, surgeon. INDICATION FOR PROCEDURE: Please review the preoperative history and physical. Briefly, the patient is a 36year old female who presents with 1 day of generalized abdominal pain is now localized to the right lower quadrant. The patient was seen and evaluated in the Emergency Department. Diagnostic imaging is consistent with acute appendicitis. The patient is admitted and surgical consult is requested. The patient is interviewed in the presence of family. The risks, benefits, and alternatives of surgical intervention were explained to the patient and the family in detail, including but not limited to bleeding, infection, nondiagnostic yield, incorrect diagnosis, injury to adjacent organs and structures, postoperative infection and/or abscess creation, conversion to open procedure, and the need for further therapeutic, diagnostic, or surgical intervention. The patient and family voiced understanding. All of their pertinent questions were answered to their satisfaction, after whichwilling and informed consent to proceed with surgery was obtained.    DESCRIPTION OF PROCEDURE: The patient was brought to the operating room, and after the induction of general endotracheal anesthesia, a time-out was performed. Next, the abdomen was prepped and draped in the usual sterile fashion. Next, the umbilicus was grasped and elevated with an Allis clamp and two perforating towel clips. A periumbilical incision was made, and a Veress needle was used to establish pneumoperitoneum. After pneumoperitoneum was established in the usual manner, a 5-mm trocar was placed through the incision, and a 5-mm laparoscope was inserted into the abdomen. Brief diagnostic survey reveals no abnormalities of the visualized abdominal viscera. Attention was turned to the right lower quadrant, where an acutely inflamed appendix that was non-perforated was identified. Next, the patient was placed in Trendelenburg position, right side up. An 11-mm incision was made in the suprapubic position, through which a trocar was placed under direct vision, and a 5-mm incision was made in the left lower quadrant, through which a 5-mm trocar was placed under direct vision. Next, the appendix was grasped, elevated and retracted. It was bluntly  from its adhesions to the pelvic sidewall, and using blunt dissection, a window was made in the avascular space between the mesoappendix and the base of the appendix at the base of the cecum with the [facility]. Next, a 5-mm LigaSure device was used to divide the mesoappendix. An EndoGIA stapler with a blue cartridge was used to divide the base of the appendix, which was then placed into a retrival sac and removed from the suprapubic position under direct vision. The surgical site was then visualized and inspected. There was no bleeding or drainage. Hemostasis was excellent. The right lower quadrant was copiously irrigated until the effluent fluid was clear. The pelvis was also irrigated. Further evaluation revealed no other acute abnormalities within the abdomen.     At this point, the patient was placed in neutral rotation. The 11-mm incision in the suprapubic position was reapproximated using 0 Maxon suture with an Endo Close device and a  guide. Next, the 5-mm trocar was removed under direct vision. Pneumoperitoneum was aspirated and released. The 5-mm camera and trocar were removed from the umbilical position. All wounds were cleansed and irrigated and injected with local anesthetic. All skin incisions were reapproximated using running subcuticular 4-0 Vicryl suture. Skin glue was used to reapproximate the incisions. The patient was awakened from anesthesia and brought to the recovery room in stable condition, having tolerated the procedure without apparent complication. Needle, sponge, and instrument counts were correct at the end of the procedure. Operative findings were discussed with the patient's family immediately upon conclusion of surgery.

## 2023-02-15 ENCOUNTER — OFFICE VISIT (OUTPATIENT)
Facility: LOCATION | Age: 43
End: 2023-02-15

## 2023-02-15 VITALS — TEMPERATURE: 97 F | HEART RATE: 93 BPM

## 2023-02-15 DIAGNOSIS — Z90.49 S/P LAPAROSCOPIC APPENDECTOMY: ICD-10-CM

## 2023-02-15 DIAGNOSIS — Z98.890 POSTOPERATIVE STATE: Primary | ICD-10-CM

## 2023-02-15 DIAGNOSIS — K35.80 ACUTE APPENDICITIS, UNSPECIFIED ACUTE APPENDICITIS TYPE: ICD-10-CM

## 2023-02-15 PROCEDURE — 99024 POSTOP FOLLOW-UP VISIT: CPT

## 2023-05-02 ENCOUNTER — TELEPHONE (OUTPATIENT)
Dept: FAMILY MEDICINE CLINIC | Facility: CLINIC | Age: 43
End: 2023-05-02

## 2023-05-02 NOTE — TELEPHONE ENCOUNTER
Problems   The patient or proxy has not reviewed this information. Medications   The patient or proxy has not reviewed this information, and there are updates pending:   Requested Medication Removals Start Date Reported By  Comments   oxyCODONE 5 MG Oral Tab 2/3/2023 Andressa Molina Post op meds done     Allergies   The patient or proxy has not reviewed this information. Medication list updated as requested.

## 2023-05-12 ENCOUNTER — LAB ENCOUNTER (OUTPATIENT)
Dept: LAB | Age: 43
End: 2023-05-12
Attending: EMERGENCY MEDICINE
Payer: COMMERCIAL

## 2023-05-12 ENCOUNTER — OFFICE VISIT (OUTPATIENT)
Dept: FAMILY MEDICINE CLINIC | Facility: CLINIC | Age: 43
End: 2023-05-12
Payer: COMMERCIAL

## 2023-05-12 VITALS
WEIGHT: 139.75 LBS | HEART RATE: 85 BPM | OXYGEN SATURATION: 95 % | RESPIRATION RATE: 21 BRPM | SYSTOLIC BLOOD PRESSURE: 122 MMHG | DIASTOLIC BLOOD PRESSURE: 70 MMHG | BODY MASS INDEX: 24.76 KG/M2 | HEIGHT: 63 IN

## 2023-05-12 DIAGNOSIS — E78.00 HYPERCHOLESTEROLEMIA: ICD-10-CM

## 2023-05-12 DIAGNOSIS — E78.00 HYPERCHOLESTEROLEMIA: Primary | ICD-10-CM

## 2023-05-12 DIAGNOSIS — R73.03 PREDIABETES: ICD-10-CM

## 2023-05-12 DIAGNOSIS — E04.2 MULTIPLE THYROID NODULES: ICD-10-CM

## 2023-05-12 DIAGNOSIS — Z12.31 ENCOUNTER FOR SCREENING MAMMOGRAM FOR MALIGNANT NEOPLASM OF BREAST: ICD-10-CM

## 2023-05-12 DIAGNOSIS — Z00.00 LABORATORY EXAMINATION ORDERED AS PART OF A ROUTINE GENERAL MEDICAL EXAMINATION: ICD-10-CM

## 2023-05-12 LAB
ALBUMIN SERPL-MCNC: 3.9 G/DL (ref 3.4–5)
ALBUMIN/GLOB SERPL: 1 {RATIO} (ref 1–2)
ALP LIVER SERPL-CCNC: 79 U/L
ALT SERPL-CCNC: 37 U/L
ANION GAP SERPL CALC-SCNC: 4 MMOL/L (ref 0–18)
AST SERPL-CCNC: 25 U/L (ref 15–37)
BASOPHILS # BLD AUTO: 0.06 X10(3) UL (ref 0–0.2)
BASOPHILS NFR BLD AUTO: 1 %
BILIRUB SERPL-MCNC: 0.4 MG/DL (ref 0.1–2)
BUN BLD-MCNC: 11 MG/DL (ref 7–18)
CALCIUM BLD-MCNC: 9 MG/DL (ref 8.5–10.1)
CHLORIDE SERPL-SCNC: 107 MMOL/L (ref 98–112)
CHOLEST SERPL-MCNC: 295 MG/DL (ref ?–200)
CO2 SERPL-SCNC: 25 MMOL/L (ref 21–32)
CREAT BLD-MCNC: 0.71 MG/DL
EOSINOPHIL # BLD AUTO: 0.22 X10(3) UL (ref 0–0.7)
EOSINOPHIL NFR BLD AUTO: 3.6 %
ERYTHROCYTE [DISTWIDTH] IN BLOOD BY AUTOMATED COUNT: 12.5 %
EST. AVERAGE GLUCOSE BLD GHB EST-MCNC: 148 MG/DL (ref 68–126)
FASTING PATIENT LIPID ANSWER: YES
FASTING STATUS PATIENT QL REPORTED: YES
GFR SERPLBLD BASED ON 1.73 SQ M-ARVRAT: 108 ML/MIN/1.73M2 (ref 60–?)
GLOBULIN PLAS-MCNC: 4 G/DL (ref 2.8–4.4)
GLUCOSE BLD-MCNC: 96 MG/DL (ref 70–99)
HBA1C MFR BLD: 6.8 % (ref ?–5.7)
HCT VFR BLD AUTO: 42.9 %
HDLC SERPL-MCNC: 78 MG/DL (ref 40–59)
HGB BLD-MCNC: 13.7 G/DL
IMM GRANULOCYTES # BLD AUTO: 0.02 X10(3) UL (ref 0–1)
IMM GRANULOCYTES NFR BLD: 0.3 %
LDLC SERPL CALC-MCNC: 206 MG/DL (ref ?–100)
LYMPHOCYTES # BLD AUTO: 2.03 X10(3) UL (ref 1–4)
LYMPHOCYTES NFR BLD AUTO: 33.6 %
MCH RBC QN AUTO: 28.4 PG (ref 26–34)
MCHC RBC AUTO-ENTMCNC: 31.9 G/DL (ref 31–37)
MCV RBC AUTO: 89 FL
MONOCYTES # BLD AUTO: 0.59 X10(3) UL (ref 0.1–1)
MONOCYTES NFR BLD AUTO: 9.8 %
NEUTROPHILS # BLD AUTO: 3.13 X10 (3) UL (ref 1.5–7.7)
NEUTROPHILS # BLD AUTO: 3.13 X10(3) UL (ref 1.5–7.7)
NEUTROPHILS NFR BLD AUTO: 51.7 %
NONHDLC SERPL-MCNC: 217 MG/DL (ref ?–130)
OSMOLALITY SERPL CALC.SUM OF ELEC: 281 MOSM/KG (ref 275–295)
PLATELET # BLD AUTO: 400 10(3)UL (ref 150–450)
POTASSIUM SERPL-SCNC: 3.7 MMOL/L (ref 3.5–5.1)
PROT SERPL-MCNC: 7.9 G/DL (ref 6.4–8.2)
RBC # BLD AUTO: 4.82 X10(6)UL
SODIUM SERPL-SCNC: 136 MMOL/L (ref 136–145)
TRIGL SERPL-MCNC: 74 MG/DL (ref 30–149)
TSI SER-ACNC: 0.71 MIU/ML (ref 0.36–3.74)
VLDLC SERPL CALC-MCNC: 16 MG/DL (ref 0–30)
WBC # BLD AUTO: 6.1 X10(3) UL (ref 4–11)

## 2023-05-12 PROCEDURE — 3074F SYST BP LT 130 MM HG: CPT | Performed by: EMERGENCY MEDICINE

## 2023-05-12 PROCEDURE — 3008F BODY MASS INDEX DOCD: CPT | Performed by: EMERGENCY MEDICINE

## 2023-05-12 PROCEDURE — 36415 COLL VENOUS BLD VENIPUNCTURE: CPT

## 2023-05-12 PROCEDURE — 80053 COMPREHEN METABOLIC PANEL: CPT

## 2023-05-12 PROCEDURE — 3078F DIAST BP <80 MM HG: CPT | Performed by: EMERGENCY MEDICINE

## 2023-05-12 PROCEDURE — 3044F HG A1C LEVEL LT 7.0%: CPT | Performed by: EMERGENCY MEDICINE

## 2023-05-12 PROCEDURE — 99214 OFFICE O/P EST MOD 30 MIN: CPT | Performed by: EMERGENCY MEDICINE

## 2023-05-12 PROCEDURE — 84443 ASSAY THYROID STIM HORMONE: CPT

## 2023-05-12 PROCEDURE — 80061 LIPID PANEL: CPT

## 2023-05-12 PROCEDURE — 85025 COMPLETE CBC W/AUTO DIFF WBC: CPT

## 2023-05-12 PROCEDURE — 83036 HEMOGLOBIN GLYCOSYLATED A1C: CPT

## 2023-05-12 NOTE — PATIENT INSTRUCTIONS
Thank you for choosing [facility]  To Do:  FOR [facility]    Follow up in 1 month for annual physical  HAve blood tests done  Low fat low cholesterol diet  Follow up with ARVIN kang thyroid nodule, Dr. Heike Ureña for mammogram    Dr. Gama Hammer  Otolaryngology    [address], [address]   Phone: [phone]   Fax: [phone]

## 2023-06-16 ENCOUNTER — OFFICE VISIT (OUTPATIENT)
Dept: FAMILY MEDICINE CLINIC | Facility: CLINIC | Age: 43
End: 2023-06-16
Payer: COMMERCIAL

## 2023-06-16 VITALS
SYSTOLIC BLOOD PRESSURE: 122 MMHG | HEIGHT: 63 IN | OXYGEN SATURATION: 98 % | BODY MASS INDEX: 25.38 KG/M2 | HEART RATE: 91 BPM | RESPIRATION RATE: 21 BRPM | WEIGHT: 143.25 LBS | DIASTOLIC BLOOD PRESSURE: 74 MMHG

## 2023-06-16 DIAGNOSIS — E78.01 FAMILIAL HYPERCHOLESTEROLEMIA: ICD-10-CM

## 2023-06-16 DIAGNOSIS — Z00.00 ENCOUNTER FOR ANNUAL PHYSICAL EXAM: Primary | ICD-10-CM

## 2023-06-16 DIAGNOSIS — N97.9 INABILITY TO CONCEIVE, FEMALE: ICD-10-CM

## 2023-06-16 DIAGNOSIS — Z12.4 ENCOUNTER FOR SCREENING FOR CERVICAL CANCER: ICD-10-CM

## 2023-06-16 DIAGNOSIS — E11.9 NEW ONSET TYPE 2 DIABETES MELLITUS (HCC): ICD-10-CM

## 2023-06-16 PROCEDURE — 3074F SYST BP LT 130 MM HG: CPT | Performed by: EMERGENCY MEDICINE

## 2023-06-16 PROCEDURE — 88175 CYTOPATH C/V AUTO FLUID REDO: CPT | Performed by: EMERGENCY MEDICINE

## 2023-06-16 PROCEDURE — 87624 HPV HI-RISK TYP POOLED RSLT: CPT | Performed by: EMERGENCY MEDICINE

## 2023-06-16 PROCEDURE — 99396 PREV VISIT EST AGE 40-64: CPT | Performed by: EMERGENCY MEDICINE

## 2023-06-16 PROCEDURE — 3078F DIAST BP <80 MM HG: CPT | Performed by: EMERGENCY MEDICINE

## 2023-06-16 PROCEDURE — 3008F BODY MASS INDEX DOCD: CPT | Performed by: EMERGENCY MEDICINE

## 2023-06-16 PROCEDURE — 99214 OFFICE O/P EST MOD 30 MIN: CPT | Performed by: EMERGENCY MEDICINE

## 2023-06-16 RX ORDER — BLOOD-GLUCOSE METER
1 EACH MISCELLANEOUS
Qty: 1 EACH | Refills: 0 | COMMUNITY
Start: 2023-06-16 | End: 2024-06-15

## 2023-06-16 RX ORDER — BLOOD-GLUCOSE METER
1 EACH MISCELLANEOUS 2 TIMES DAILY
Qty: 1 KIT | Refills: 0 | Status: SHIPPED | OUTPATIENT
Start: 2023-06-16 | End: 2024-06-15

## 2023-06-16 RX ORDER — ATORVASTATIN CALCIUM 20 MG/1
20 TABLET, FILM COATED ORAL NIGHTLY
Qty: 90 TABLET | Refills: 0 | Status: SHIPPED | OUTPATIENT
Start: 2023-06-16

## 2023-06-16 RX ORDER — CALCIUM CITRATE/VITAMIN D3 200MG-6.25
TABLET ORAL
Qty: 300 STRIP | Refills: 1 | Status: SHIPPED | OUTPATIENT
Start: 2023-06-16

## 2023-06-16 RX ORDER — GLUCOSAM/CHON-MSM1/C/MANG/BOSW 500-416.6
1 TABLET ORAL
Qty: 100 EACH | Refills: 3 | Status: SHIPPED | OUTPATIENT
Start: 2023-06-16 | End: 2024-06-15

## 2023-06-16 RX ORDER — BLOOD SUGAR DIAGNOSTIC
STRIP MISCELLANEOUS
Qty: 100 STRIP | Refills: 3 | Status: SHIPPED | OUTPATIENT
Start: 2023-06-16 | End: 2024-06-15

## 2023-06-19 LAB — HPV I/H RISK 1 DNA SPEC QL NAA+PROBE: NEGATIVE

## 2023-08-11 ENCOUNTER — HOSPITAL ENCOUNTER (OUTPATIENT)
Dept: MAMMOGRAPHY | Age: 43
Discharge: HOME | End: 2023-08-11
Attending: EMERGENCY MEDICINE
Payer: COMMERCIAL

## 2023-08-11 DIAGNOSIS — Z12.31 ENCOUNTER FOR SCREENING MAMMOGRAM FOR MALIGNANT NEOPLASM OF BREAST: ICD-10-CM

## 2023-08-11 PROCEDURE — 77063 BREAST TOMOSYNTHESIS BI: CPT | Performed by: EMERGENCY MEDICINE

## 2023-08-11 PROCEDURE — 77067 SCR MAMMO BI INCL CAD: CPT | Performed by: EMERGENCY MEDICINE

## 2024-02-28 ENCOUNTER — OFFICE VISIT (OUTPATIENT)
Dept: FAMILY MEDICINE CLINIC | Facility: CLINIC | Age: 44
End: 2024-02-28
Payer: COMMERCIAL

## 2024-02-28 VITALS
SYSTOLIC BLOOD PRESSURE: 130 MMHG | OXYGEN SATURATION: 97 % | HEART RATE: 100 BPM | DIASTOLIC BLOOD PRESSURE: 80 MMHG | BODY MASS INDEX: 25.52 KG/M2 | RESPIRATION RATE: 20 BRPM | TEMPERATURE: 98 F | HEIGHT: 63 IN | WEIGHT: 144 LBS

## 2024-02-28 DIAGNOSIS — S16.1XXA STRAIN OF NECK MUSCLE, INITIAL ENCOUNTER: Primary | ICD-10-CM

## 2024-02-28 PROCEDURE — 99213 OFFICE O/P EST LOW 20 MIN: CPT | Performed by: PHYSICIAN ASSISTANT

## 2024-02-28 RX ORDER — CYCLOBENZAPRINE HCL 10 MG
10 TABLET ORAL 3 TIMES DAILY PRN
Qty: 30 TABLET | Refills: 0 | Status: SHIPPED | OUTPATIENT
Start: 2024-02-28

## 2024-02-28 NOTE — PROGRESS NOTES
CHIEF COMPLAINT:     Chief Complaint   Patient presents with    Neck Pain     Stiff neck on left side for 4 days.         HPI:   Andressa Molina is a 43 year old female who presents with complaints of a week and a half.  The patient denies any injury.  The patient reports she took a nap during the day and woke up with pain on the left side of her neck.  Pain is currently 8 out of 10, described as tense, worse with rotation to the right or left, patient has tried Tylenol for pain without relief.  The patient has also tried Motrin at home but she could not tolerate the GI upset so she stopped.  Patient denies any previous injury to her neck.  The patient denies headache, dizziness, changes in vision, numbness, tingling, weakness.  The patient is requesting a note to be excused from work today.    Current Outpatient Medications   Medication Sig Dispense Refill    cyclobenzaprine 10 MG Oral Tab Take 1 tablet (10 mg total) by mouth 3 (three) times daily as needed for Muscle spasms. 30 tablet 0    atorvastatin 20 MG Oral Tab Take 1 tablet (20 mg total) by mouth nightly. 90 tablet 0    metFORMIN 500 MG Oral Tab Take 1 tablet (500 mg total) by mouth 2 (two) times daily with meals. 180 tablet 0    Blood Glucose Monitoring Suppl (TRUE METRIX METER) Does not apply Device 1 Device by Other route every morning before breakfast. Check blood glucose sugar level every morning before breakfast 1 each 0    Glucose Blood (TRUE METRIX BLOOD GLUCOSE TEST) In Vitro Strip E11.9 300 strip 1    TRUEplus Lancets 33G Does not apply Misc 1 Lancet by Finger stick route every morning before breakfast. Use as directed. 100 each 3    Glucose Blood (ACCU-CHEK GUIDE) In Vitro Strip Check blood sugars once a day before meals 100 strip 3    Blood Glucose Monitoring Suppl (ACCU-CHEK ED PLUS) w/Device Does not apply Kit 1 Device by Other route 2 (two) times daily. 1 kit 0    omeprazole 20 MG Oral Capsule Delayed Release Take 1 capsule (20 mg  total) by mouth 2 (two) times daily before meals. 180 capsule 3    Multiple Vitamins-Minerals (MULTIVITAMIN ADULT, MINERALS,) Oral Tab         Past Medical History:   Diagnosis Date    Heartburn     High cholesterol     Indigestion     Lateral epicondylitis, right elbow 6/7/2018    Problems with swallowing     Thyroid cyst 2009    had benign biopsy    Uncomfortable fullness after meals       Social History:  Social History     Socioeconomic History    Marital status:    Tobacco Use    Smoking status: Never     Passive exposure: Never    Smokeless tobacco: Never   Vaping Use    Vaping Use: Never used   Substance and Sexual Activity    Alcohol use: No    Drug use: No    Sexual activity: Yes     Partners: Male        REVIEW OF SYSTEMS:   GENERAL: Denies fever, chills,weight change, decreased appetite  SKIN: Denies rashes, skin wounds or ulcers.  EYES: Denies blurred vision or double vision  HENT: Denies congestion, rhinorrhea, sore throat or ear pain  CHEST: Denies chest pain, or palpitations  LUNGS: Denies shortness of breath, cough, or wheezing  GI: Denies abdominal pain, N/V/C/D.   MUSCULOSKELETAL: See HPI  LYMPH:  Denies lymphadenopathy  NEURO: Denies headaches or lightheadedness      EXAM:   /80   Pulse 100   Temp 98.4 °F (36.9 °C) (Oral)   Resp 20   Ht 5' 3\" (1.6 m)   Wt 144 lb (65.3 kg)   LMP 05/07/2023 (Approximate)   SpO2 97%   BMI 25.51 kg/m²   GENERAL: well developed, well nourished,in no apparent distress, cooperative   SKIN: no rashes, nosuspicious lesions, no abnormal pigmentation  HEAD: atraumatic, normocephalic  EYES: EOM intact, PERRL.  Conjunctiva normal.  Cornea clear.  Lid margins normal.  No active drainage.  EARS: Right TM normal, no bulging, no retraction, no fluid, bony landmarks normal.  Left TM normal, no bulging, no retraction, no fluid, bony landmarks normal.    NOSE: nostrils patent, no discharge, nasal mucosa pink and not inflamed.  No sinus tenderness.  THROAT: oral  mucosa pink, moist and intact. No visible dental caries. Posterior pharynx without erythema or exudates.  NECK: supple, non-tender.  No midline tenderness.  Right paraspinal muscles are nontender.  Tender to palpation of the left paraspinal muscles with reproduction of pain.  Muscle tension appreciated on exam.  Normal range of motion in flexion and extension.  Decreased range of motion and rotation to both the left and the right.  Decreased range of motion in lateral flexion bilaterally.  LUNGS: clear to auscultation bilaterally, no wheezes or rhonchi. Breathing is non labored.  CARDIO: RRR without murmur  GI: No visible scars, or masses. BS's present x4. No palpable masses or hepatosplenomegaly.  Non tender.  No guarding or rebound tenderness  EXTREMITIES: no cyanosis, clubbing or edema.  Homans NEG.  Dorsalis Pedis 2+.  LYMPH:  No lymphadenopathy.    NEURO: A&Ox3.  CN II-XII intact.  No focal deficits.  Coordination and Gait normal.  Kernig and Brudzinski's are negative.      ASSESSMENT AND PLAN:     ASSESSMENT:  Encounter Diagnosis   Name Primary?    Strain of neck muscle, initial encounter Yes       PLAN:    Patient Instructions   Tylenol  Flexeril  Ice->heat  Follow up with PCP   If worse seek treatment

## 2024-07-01 ENCOUNTER — PATIENT MESSAGE (OUTPATIENT)
Dept: OTOLARYNGOLOGY | Facility: CLINIC | Age: 44
End: 2024-07-01

## 2024-07-01 ENCOUNTER — OFFICE VISIT (OUTPATIENT)
Dept: OTOLARYNGOLOGY | Facility: CLINIC | Age: 44
End: 2024-07-01
Payer: COMMERCIAL

## 2024-07-01 VITALS — WEIGHT: 144 LBS | BODY MASS INDEX: 25.52 KG/M2 | HEIGHT: 63 IN

## 2024-07-01 DIAGNOSIS — E04.2 MULTIPLE THYROID NODULES: Primary | ICD-10-CM

## 2024-07-01 DIAGNOSIS — K21.00 GASTROESOPHAGEAL REFLUX DISEASE WITH ESOPHAGITIS WITHOUT HEMORRHAGE: ICD-10-CM

## 2024-07-01 PROCEDURE — 99203 OFFICE O/P NEW LOW 30 MIN: CPT | Performed by: SPECIALIST

## 2024-07-01 RX ORDER — OMEPRAZOLE 40 MG/1
40 CAPSULE, DELAYED RELEASE ORAL DAILY
Qty: 30 CAPSULE | Refills: 2 | Status: SHIPPED | OUTPATIENT
Start: 2024-07-01

## 2024-07-01 NOTE — TELEPHONE ENCOUNTER
From: Andressa Molina  To: Caity Barney  Sent: 7/1/2024 11:01 AM CDT  Subject: After visit summary copy    Hello Dr. Barney,    I just saw you today. And I am trying to look for my after visit summary in my chart. But it is not in there. Hopefully it will be available soon. Thank you.    Andressa Molina

## 2024-07-01 NOTE — PATIENT INSTRUCTIONS
A repeat thyroid ultrasound was ordered for you.  I also creased your omeprazole to 40 mg daily.  Your exam showed posterior laryngeal erythema.  And your reflux symptoms have worsened.  Follow-up in 3 to 4 weeks time, sooner if problems.

## 2024-07-01 NOTE — PROGRESS NOTES
Andressa Molina is a 43 year old female.   Chief Complaint   Patient presents with    Swallowing Problem     Swallowing discomfort     HPI:   Patient here with swallowing discomfort.  He has known thyroid nodules.  Also has had a worsening of her reflux symptoms.  Is currently on omeprazole 20 mg.    Current Outpatient Medications   Medication Sig Dispense Refill    Omeprazole 40 MG Oral Capsule Delayed Release Take 1 capsule (40 mg total) by mouth daily. Before a meal 30 capsule 2    atorvastatin 20 MG Oral Tab Take 1 tablet (20 mg total) by mouth nightly. 90 tablet 0    metFORMIN 500 MG Oral Tab Take 1 tablet (500 mg total) by mouth 2 (two) times daily with meals. 180 tablet 0    Glucose Blood (TRUE METRIX BLOOD GLUCOSE TEST) In Vitro Strip E11.9 300 strip 1    omeprazole 20 MG Oral Capsule Delayed Release Take 1 capsule (20 mg total) by mouth 2 (two) times daily before meals. 180 capsule 3    Multiple Vitamins-Minerals (MULTIVITAMIN ADULT, MINERALS,) Oral Tab       cyclobenzaprine 10 MG Oral Tab Take 1 tablet (10 mg total) by mouth 3 (three) times daily as needed for Muscle spasms. 30 tablet 0      Past Medical History:    Heartburn    High cholesterol    Indigestion    Lateral epicondylitis, right elbow    Problems with swallowing    Thyroid cyst    had benign biopsy    Uncomfortable fullness after meals      Social History:  Social History     Socioeconomic History    Marital status:    Tobacco Use    Smoking status: Never     Passive exposure: Never    Smokeless tobacco: Never   Vaping Use    Vaping status: Never Used   Substance and Sexual Activity    Alcohol use: No    Drug use: No    Sexual activity: Yes     Partners: Male     Social Determinants of Health      Received [facility], [facility]    Social Connections    Received from [facility], [facility]    Housing Stability        REVIEW OF SYSTEMS:   GENERAL  HEALTH: feels well otherwise  GENERAL : denies fever, chills, sweats, weight loss, weight gain  SKIN: denies any unusual skin lesions or rashes  RESPIRATORY: denies shortness of breath with exertion  NEURO: denies headaches    EXAM:   Ht 5' 3\" (1.6 m)   Wt 144 lb (65.3 kg)   BMI 25.51 kg/m²   System Details   Skin Inspection - Normal.   Constitutional Overall appearance - Normal.   Head/Face Facial features - Normal. Eyebrows - Normal. Skull - Normal.   Eyes Conjunctiva - Right: Normal, Left: Normal. Pupil - Right: Normal, Left: Normal.    Ears Inspection - Right: Normal, Left: Normal.   Canal - Right: Normal, Left: Normal.    TM - Right: Normal, Left: Normal.   Nasal External nose - Normal.   Nasal septum - Normal.  Turbinates - Normal   Oral/Oropharynx Lips - Normal, Tonsils - Normal, Tongue - Normal    Neck Exam Inspection - Normal. Palpation - Normal. Parotid gland - Normal. Thyroid gland -no palpable thyroid nodules   Lymph Detail Submental. Submandibular. Anterior cervical. Posterior cervical. Supraclavicular.  All without enlargement   Psychiatric Orientation - Oriented to time, place, person & situation. Appropriate mood and affect.   Neurological Memory - Normal. Cranial nerves - Cranial nerves II through XII grossly intact.   Larynx Mirror examination of the larynx showed posterior laryngeal erythema.  No retained secretions.  Cannot fully visualize anterior larynx by mirror examination.     ASSESSMENT AND PLAN:   1. Multiple thyroid nodules  Reviewed thyroid ultrasound done August 10, 2023 which showed 4 nodules on the left.  The second of which was a left mid lobe nodule measuring 1.3 x 1.2 x 1.0 cm which was a TI RADS 4.  Repeat ultrasound recommended in 1 year's time.  Order sent.  - US THYROID (CPT=76536); Future    2. Gastroesophageal reflux disease with esophagitis without hemorrhage  Patient with a EGD in the past showing reflux disease associated with esophagitis.  Patient has been on 20 mg of  omeprazole however this is not Been controlling her symptoms.  Dose was increased to 40 mg.  Patient also given an antireflux diet as well as a handout for laryngotracheal reflux disease.  Consider fiberoptic scope and return if symptoms are persistent.  Follow-up in 3 to 4 weeks time, sooner if problems.        The patient indicates understanding of these issues and agrees to the plan.      Caity Barney MD  7/1/2024  12:35 PM

## 2024-07-12 ENCOUNTER — HOSPITAL ENCOUNTER (OUTPATIENT)
Dept: ULTRASOUND IMAGING | Age: 44
Discharge: HOME | End: 2024-07-12
Attending: SPECIALIST
Payer: COMMERCIAL

## 2024-07-12 DIAGNOSIS — E04.2 MULTIPLE THYROID NODULES: ICD-10-CM

## 2024-07-12 PROCEDURE — 76536 US EXAM OF HEAD AND NECK: CPT | Performed by: SPECIALIST

## 2024-07-17 ENCOUNTER — PATIENT MESSAGE (OUTPATIENT)
Dept: FAMILY MEDICINE CLINIC | Facility: CLINIC | Age: 44
End: 2024-07-17

## 2024-07-18 ENCOUNTER — NURSE ONLY (OUTPATIENT)
Dept: FAMILY MEDICINE CLINIC | Facility: CLINIC | Age: 44
End: 2024-07-18
Payer: COMMERCIAL

## 2024-07-18 ENCOUNTER — LAB ENCOUNTER (OUTPATIENT)
Dept: LAB | Age: 44
End: 2024-07-18
Attending: EMERGENCY MEDICINE
Payer: COMMERCIAL

## 2024-07-18 ENCOUNTER — OFFICE VISIT (OUTPATIENT)
Dept: FAMILY MEDICINE CLINIC | Facility: CLINIC | Age: 44
End: 2024-07-18
Payer: COMMERCIAL

## 2024-07-18 VITALS
SYSTOLIC BLOOD PRESSURE: 130 MMHG | HEART RATE: 88 BPM | WEIGHT: 147 LBS | RESPIRATION RATE: 22 BRPM | DIASTOLIC BLOOD PRESSURE: 80 MMHG | HEIGHT: 62.6 IN | BODY MASS INDEX: 26.38 KG/M2 | OXYGEN SATURATION: 99 %

## 2024-07-18 DIAGNOSIS — Z00.00 LABORATORY EXAMINATION ORDERED AS PART OF A ROUTINE GENERAL MEDICAL EXAMINATION: ICD-10-CM

## 2024-07-18 DIAGNOSIS — Z00.00 ENCOUNTER FOR ANNUAL PHYSICAL EXAMINATION EXCLUDING GYNECOLOGICAL EXAMINATION IN A PATIENT OLDER THAN 17 YEARS: Primary | ICD-10-CM

## 2024-07-18 DIAGNOSIS — E11.9 CONTROLLED TYPE 2 DIABETES MELLITUS WITHOUT COMPLICATION, WITHOUT LONG-TERM CURRENT USE OF INSULIN (HCC): ICD-10-CM

## 2024-07-18 DIAGNOSIS — E78.01 FAMILIAL HYPERCHOLESTEROLEMIA: ICD-10-CM

## 2024-07-18 DIAGNOSIS — Z12.31 ENCOUNTER FOR SCREENING MAMMOGRAM FOR MALIGNANT NEOPLASM OF BREAST: ICD-10-CM

## 2024-07-18 LAB
ALBUMIN SERPL-MCNC: 4.6 G/DL (ref 3.2–4.8)
ALBUMIN/GLOB SERPL: 1.4 {RATIO} (ref 1–2)
ALP LIVER SERPL-CCNC: 101 U/L
ALT SERPL-CCNC: 76 U/L
ANION GAP SERPL CALC-SCNC: 7 MMOL/L (ref 0–18)
AST SERPL-CCNC: 28 U/L (ref ?–34)
BASOPHILS # BLD AUTO: 0.07 X10(3) UL (ref 0–0.2)
BASOPHILS NFR BLD AUTO: 1 %
BILIRUB SERPL-MCNC: 0.4 MG/DL (ref 0.3–1.2)
BUN BLD-MCNC: 10 MG/DL (ref 9–23)
CALCIUM BLD-MCNC: 9.6 MG/DL (ref 8.7–10.4)
CHLORIDE SERPL-SCNC: 104 MMOL/L (ref 98–112)
CHOLEST SERPL-MCNC: 311 MG/DL (ref ?–200)
CO2 SERPL-SCNC: 25 MMOL/L (ref 21–32)
CREAT BLD-MCNC: 0.74 MG/DL
CREAT UR-SCNC: 199.4 MG/DL
EGFRCR SERPLBLD CKD-EPI 2021: 102 ML/MIN/1.73M2 (ref 60–?)
EOSINOPHIL # BLD AUTO: 0.19 X10(3) UL (ref 0–0.7)
EOSINOPHIL NFR BLD AUTO: 2.8 %
ERYTHROCYTE [DISTWIDTH] IN BLOOD BY AUTOMATED COUNT: 12.3 %
FASTING PATIENT LIPID ANSWER: YES
FASTING STATUS PATIENT QL REPORTED: YES
GLOBULIN PLAS-MCNC: 3.2 G/DL (ref 2.8–4.4)
GLUCOSE BLD-MCNC: 104 MG/DL (ref 70–99)
HCT VFR BLD AUTO: 44.8 %
HDLC SERPL-MCNC: 75 MG/DL (ref 40–59)
HEMOGLOBIN A1C: 7.6 % (ref 4.3–5.6)
HGB BLD-MCNC: 14.7 G/DL
IMM GRANULOCYTES # BLD AUTO: 0.01 X10(3) UL (ref 0–1)
IMM GRANULOCYTES NFR BLD: 0.1 %
LDLC SERPL CALC-MCNC: 219 MG/DL (ref ?–100)
LYMPHOCYTES # BLD AUTO: 2.27 X10(3) UL (ref 1–4)
LYMPHOCYTES NFR BLD AUTO: 33.1 %
MCH RBC QN AUTO: 29.6 PG (ref 26–34)
MCHC RBC AUTO-ENTMCNC: 32.8 G/DL (ref 31–37)
MCV RBC AUTO: 90.3 FL
MICROALBUMIN UR-MCNC: 0.9 MG/DL
MICROALBUMIN/CREAT 24H UR-RTO: 4.5 UG/MG (ref ?–30)
MONOCYTES # BLD AUTO: 0.55 X10(3) UL (ref 0.1–1)
MONOCYTES NFR BLD AUTO: 8 %
NEUTROPHILS # BLD AUTO: 3.77 X10 (3) UL (ref 1.5–7.7)
NEUTROPHILS # BLD AUTO: 3.77 X10(3) UL (ref 1.5–7.7)
NEUTROPHILS NFR BLD AUTO: 55 %
NONHDLC SERPL-MCNC: 236 MG/DL (ref ?–130)
OSMOLALITY SERPL CALC.SUM OF ELEC: 281 MOSM/KG (ref 275–295)
PLATELET # BLD AUTO: 320 10(3)UL (ref 150–450)
POTASSIUM SERPL-SCNC: 3.7 MMOL/L (ref 3.5–5.1)
PROT SERPL-MCNC: 7.8 G/DL (ref 5.7–8.2)
RBC # BLD AUTO: 4.96 X10(6)UL
SODIUM SERPL-SCNC: 136 MMOL/L (ref 136–145)
TRIGL SERPL-MCNC: 102 MG/DL (ref 30–149)
TSI SER-ACNC: 1.72 MIU/ML (ref 0.55–4.78)
VLDLC SERPL CALC-MCNC: 23 MG/DL (ref 0–30)
WBC # BLD AUTO: 6.9 X10(3) UL (ref 4–11)

## 2024-07-18 PROCEDURE — 85025 COMPLETE CBC W/AUTO DIFF WBC: CPT

## 2024-07-18 PROCEDURE — 84443 ASSAY THYROID STIM HORMONE: CPT

## 2024-07-18 PROCEDURE — 80053 COMPREHEN METABOLIC PANEL: CPT

## 2024-07-18 PROCEDURE — 99214 OFFICE O/P EST MOD 30 MIN: CPT | Performed by: EMERGENCY MEDICINE

## 2024-07-18 PROCEDURE — 82570 ASSAY OF URINE CREATININE: CPT

## 2024-07-18 PROCEDURE — 82043 UR ALBUMIN QUANTITATIVE: CPT

## 2024-07-18 PROCEDURE — 83036 HEMOGLOBIN GLYCOSYLATED A1C: CPT | Performed by: EMERGENCY MEDICINE

## 2024-07-18 PROCEDURE — 36415 COLL VENOUS BLD VENIPUNCTURE: CPT

## 2024-07-18 PROCEDURE — 80061 LIPID PANEL: CPT

## 2024-07-18 PROCEDURE — 99396 PREV VISIT EST AGE 40-64: CPT | Performed by: EMERGENCY MEDICINE

## 2024-07-18 PROCEDURE — 92229 IMG RTA DETC/MNTR DS POC ALY: CPT | Performed by: EMERGENCY MEDICINE

## 2024-07-18 RX ORDER — ROSUVASTATIN CALCIUM 10 MG/1
10 TABLET, COATED ORAL NIGHTLY
Qty: 90 TABLET | Refills: 0 | Status: SHIPPED | OUTPATIENT
Start: 2024-07-18

## 2024-07-18 NOTE — TELEPHONE ENCOUNTER
My chart message was reviewed on 7 18 2024 - patient has an appointment on 7 18 2024 at 10:30 am. Lab work has not been ordered prior to appointment.

## 2024-07-18 NOTE — PROGRESS NOTES
Andressa Molina is a 43 year old female who presents for a complete physical exam.   HPI:     Chief Complaint   Patient presents with    Well Adult       Age: 43    1First day of last menstrual period (or first year of         menstruation, if through menopause July 8th   2Number of times pregnant: NO              Number of completed pregnancies:  0              Date of last pregnancy:      3. If you are under age 55, what method of birth control do you use? NONE              Are you planning a pregnancy  in the next 6-12 months? YES               If you are through menopause or over age 50, do you take  any of the following pills?                          Calcium NO                        Estrogen (Premarin)              NO                        Progesterone (Provera) NO   4. Have you had any of the following problems:               A.  Abnormal Pap smears  ALL NORMAL               If yes, date:                 problem:                 For abnormality, did you have any of the following done:                    Colposcopy   NO                  Biopsies      NO                  Surgery     NO            B. High blood pressure, heart disease or high cholesterol NO                  D.Abdominal or pelvic surgery or special tests appendectomy   5. Do you have any of the following:      Problems with present method of birth control NA   Bleeding between periods or since periods stopped NO    A new or enlarging lump in breast NO      Change in size/firmness of stools   NO   Change in size/color of a mole  NO   6. Do you have a parent, brother or sister with a history of the following:                  Cancer of the breast, intestine or female organs Colon Cancer in Uncle                Heart pain or heart attacks  before the age of 55 NO  Stroke in uncle in late 50's   8. Have you ever used tobacco?     NO   9. Do you drink alcohol?     NO   10. Prevention:         b. Exercise:                              Activity:          c. Do you always wear seat belts?          YES       d. If over 30 years old, have you  had your cholesterol level checked  in the past five years? NO       e. Have you had a tetanus shot  the past 10 years? 2021       f. Does your house have a working smoke detector? YES         g. Do you have firearms at home?           NO        h. Have you ever had a mammogram?  NO       i.  How many sexual partners have you  had in the last 12 months? 1             In your lifetime?       j. When is the last time you had           a dental check-up?  Feb 2024         11. Please describe any concerns you have:         DIABETES    Last seen 1 year ago was started on Metformin  Took medication for a few months then discontinued  Felt numbness with medication, possibly from atorvastatin  + fam history of DM  + elevated LDL and elevated HBA1C  No polyuria polydipsia or poly[hagia  Eats liberally but denies excessive fatty foods  + family history of DM    Wt Readings from Last 6 Encounters:   07/18/24 147 lb (66.7 kg)   07/01/24 144 lb (65.3 kg)   02/28/24 144 lb (65.3 kg)   06/16/23 143 lb 4 oz (65 kg)   05/12/23 139 lb 12 oz (63.4 kg)   02/02/23 132 lb 4.4 oz (60 kg)       BP Readings from Last 3 Encounters:   07/18/24 130/80   02/28/24 130/80   06/16/23 122/74         Patient's last menstrual period was 07/08/2024.       Current Outpatient Medications   Medication Sig Dispense Refill    metFORMIN HCl 1000 MG Oral Tab Take 1 tablet (1,000 mg total) by mouth 2 (two) times daily with meals. Start with 1 tab once a day for 1 week , then 1 tab twice a day 180 tablet 0    rosuvastatin 10 MG Oral Tab Take 1 tablet (10 mg total) by mouth nightly. 90 tablet 0    Omeprazole 40 MG Oral Capsule Delayed Release Take 1 capsule (40 mg total) by mouth daily. Before a meal 30 capsule 2    Glucose Blood (TRUE METRIX BLOOD GLUCOSE TEST) In Vitro Strip E11.9 300 strip 1    Multiple Vitamins-Minerals (MULTIVITAMIN ADULT, MINERALS,) Oral Tab          Past Medical History:    Allergic rhinitis    Esophageal reflux    Heartburn    High cholesterol    Hyperlipidemia    Indigestion    Lateral epicondylitis, right elbow    Problems with swallowing    Thyroid cyst    had benign biopsy    Uncomfortable fullness after meals      Past Surgical History:   Procedure Laterality Date    Appendectomy  22      Family History   Problem Relation Age of Onset    Diabetes Father             Hypertension Father     Diabetes Sister     Diabetes Brother     Diabetes Brother     Diabetes Brother       Social History     Socioeconomic History    Marital status:    Tobacco Use    Smoking status: Never     Passive exposure: Never    Smokeless tobacco: Never   Vaping Use    Vaping status: Never Used   Substance and Sexual Activity    Alcohol use: No    Drug use: No    Sexual activity: Yes     Partners: Male   Other Topics Concern    Caffeine Concern No    Exercise No    Seat Belt No    Special Diet No    Stress Concern No    Weight Concern No     Social Determinants of Health      Received from [facility], [facility]    Social Connections    Received from [facility], [facility]    Housing Stability        Current Outpatient Medications   Medication Sig Dispense Refill    metFORMIN HCl 1000 MG Oral Tab Take 1 tablet (1,000 mg total) by mouth 2 (two) times daily with meals. Start with 1 tab once a day for 1 week , then 1 tab twice a day 180 tablet 0    rosuvastatin 10 MG Oral Tab Take 1 tablet (10 mg total) by mouth nightly. 90 tablet 0    Omeprazole 40 MG Oral Capsule Delayed Release Take 1 capsule (40 mg total) by mouth daily. Before a meal 30 capsule 2    Glucose Blood (TRUE METRIX BLOOD GLUCOSE TEST) In Vitro Strip E11.9 300 strip 1    Multiple Vitamins-Minerals (MULTIVITAMIN ADULT, MINERALS,) Oral Tab         Past Medical History:    Allergic rhinitis    Esophageal reflux     Heartburn    High cholesterol    Hyperlipidemia    Indigestion    Lateral epicondylitis, right elbow    Problems with swallowing    Thyroid cyst    had benign biopsy    Uncomfortable fullness after meals      Past Surgical History:   Procedure Laterality Date    Appendectomy  22      Family History   Problem Relation Age of Onset    Diabetes Father             Hypertension Father     Diabetes Sister     Diabetes Brother     Diabetes Brother     Diabetes Brother       Social History:   Social History     Socioeconomic History    Marital status:    Tobacco Use    Smoking status: Never     Passive exposure: Never    Smokeless tobacco: Never   Vaping Use    Vaping status: Never Used   Substance and Sexual Activity    Alcohol use: No    Drug use: No    Sexual activity: Yes     Partners: Male   Other Topics Concern    Caffeine Concern No    Exercise No    Seat Belt No    Special Diet No    Stress Concern No    Weight Concern No     Social Determinants of Health      Received from [facility], [facility]    Social Connections    Received from [facility], [facility]    Housing Stability            REVIEW OF SYSTEMS:   GENERAL HEALTH: feels well, no fatigue.  SKIN: denies any unusual skin lesions or rashes  EYES: no visual complaints or deficits  HEENT: denies nasal congestion, sinus pain or sore throat; hearing loss negative,   RESPIRATORY: denies shortness of breath, wheezing or cough   CARDIOVASCULAR: denies chest pain, SOB, edema,orthopnea, no palpitations   GI: denies nausea, vomiting, constipation, diarrhea; no rectal bleeding; no heartburn  GENITAL/: no dysuria, urgency or frequency  MUSCULOSKELETAL: no joint complaints upper or lower extremities  NEURO: no sensory or motor complaint  HEMATOLOGY: denies hx anemia; denies bruising or excessive bleeding  ENDOCRINE: denies excessive thirst or urination; denies unexpected  wt gain or wt loss  ALLERGY/IMM.: denies food or seasonal allergies  PSYCH: no symptoms of depression or anxiety, depression screening negative.      EXAM:   /80   Pulse 88   Resp 22   Ht 5' 2.6\" (1.59 m)   Wt 147 lb (66.7 kg)   LMP 07/08/2024   SpO2 99%   BMI 26.38 kg/m²      General: WD/WN in no acute distress.   HEENT: PERRLA and EOMI.  OP moist no lesions.TM WNL, rufina.Normal ears canals bilaterally.  Neck is supple, with no cervical LAD or thyroid abnormalities. No carotid bruits.    Lungs: are clear to auscultation bilaterally, with no wheeze, rhonchi, or rales.   Heart: is RRR.  S1, S2, with no murmurs,clicks, gallops    Abdomen: is soft,NBS, NT/ND with no HSM.  No rebound or guarding. No CVA tenderness, no hernias.   exam: deferred  Neuro: Cranial nerves II-XII normal,no focal abnormalities, and reflexes coordination and gait normal and symmetric.Sensation intact.  Extremities: are symmetric with no cyanosis, clubbing, or edema.  MS: Normal muscles tones, no joints abnormalities.  SKIN: Normal color, turgor, no lesions, rashes or wounds.  PSYCH: normal affect and mood.      ASSESSMENT AND PLAN:       1. Encounter for annual physical examination excluding gynecological examination in a patient older than 17 years    2. Laboratory examination ordered as part of a routine general medical examination  - CBC With Differential With Platelet; Future  - Comp Metabolic Panel (14); Future  - Lipid Panel; Future  - TSH W Reflex To Free T4; Future    3. Controlled type 2 diabetes mellitus without complication, without long-term current use of insulin (HCC)  - Microalb/Creat Ratio, Random Urine; Future  - OPHTHALMOLOGY - INTERNAL  - POC Hgb A1C  - metFORMIN HCl 1000 MG Oral Tab; Take 1 tablet (1,000 mg total) by mouth 2 (two) times daily with meals. Start with 1 tab once a day for 1 week , then 1 tab twice a day  Dispense: 180 tablet; Refill: 0  - rosuvastatin 10 MG Oral Tab; Take 1 tablet (10 mg total) by  mouth nightly.  Dispense: 90 tablet; Refill: 0  - Diabetic Retinopathy Exam  OU - Both Eyes; Future  Hemoglobin A1c higher today at 7.6.  Currently on no medication due to poor follow-up  Will restart metformin.  Counseled on low-fat low-cholesterol diet and low-carb diet.  Will refer to diabetic educator.  Follow-up in 2 to 3 months will need repeat hemoglobin A1c in office awake labs.    4. Encounter for screening mammogram for malignant neoplasm of breast  - [facility] 2D+3D SCREENING BILAT (CPT=77067/56259); Future    5. Familial hypercholesterolemia  - CT CALCIUM SCORING; Future  - OP REFERRAL DIABETES FULL ED 10 HRS GROUP/IND  Elevated LDL on last lab with LDL greater than 200.  Most likely familial hypercholesterolemia.  Developed muscle aches with atorvastatin.  Will try rosuvastatin.  Await labs.        Andressa Molina is a 43 year old female who presents for a complete physical exam.Gyn exam and Pap smear UTD   Self breast exams advised.  The patient should schedule annual mammograms beginning at the age of 40.    Counseled on fat diet and aerobic exercise 30 minutes three times weekly.   Counseled on maintaining a healthy weight and healthy BMI  Health maintenance.   Immunizations reviewed and updated  TDAP UTD  The patient indicates understanding of these issues and agrees to the plan.  The patient is asked  to return yearly for annual preventative health exam.    Well balanced diet recommended.    Routine exercise recommended most days during the week.  Wear sunscreen - SPF 15 or higher and reapply every 2 hours as needed.  Wear seat belts and drive safely.  Schedule regular appointments with dentist.  Schedule yearly eye exam if you wear glasses/contacts.  Yearly Flu Vaccine recommended.  Tetanus, Diptheria and Pertussis vaccine should be given every 7-10 years.  Call or come in if there are concerns regarding domestic abuse, sexually transmitted diseases, alcohol/drug addiction, depression/anxiety  issues, or any further concerns.    PATIENT INSTRUCTIONS:    Restart Metformin  Have blood tests done  Arrange for mammogram  Start Rosuvastatin 10 mg  Follow up with lev Barger  Follow up in 2-3 months for recheck  Check blood sugars once a day  Have blood tests done  Arrange for heart scan  Check blood sugars once a day before meals  FOLLOW UP:   2-3 months      Health Maintenance Due   Topic Date Due    Diabetes Care Foot Exam  Never done    Diabetes Care Dilated Eye Exam  Never done    Diabetes Care: Microalb/Creat Ratio  Never done    Pneumococcal Vaccine: Birth to 64yrs (1 of 2 - PCV) Never done    COVID-19 Vaccine (5 - 2023-24 season) 09/01/2023    Diabetes Care A1C  11/12/2023    Diabetes Care: GFR  05/12/2024    Annual Physical  06/16/2024    Mammogram  08/11/2024

## 2024-07-18 NOTE — TELEPHONE ENCOUNTER
From: Andressa Molina  To: Jeaneth Queen  Sent: 7/17/2024 9:36 AM CDT  Subject: Labs prior check up    Hi Dr. Queen,    I am requesting if me and my  can have labs prior our annual check up tomorrow.    Thank you,  Alen Molina Jr

## 2024-07-18 NOTE — PATIENT INSTRUCTIONS
Thank you for choosing [facility]  To Do:  FOR FREDDIE DODD    Restart Metformin  Have blood tests done  Arrange for mammogram  Start Rosuvastatin 10 mg  Follow up with lev Barger  Follow up in 2-3 months for recheck  Check blood sugars once a day  Have blood tests done  Arrange for heart scan  Check blood sugars once a day before meals        DIABETIC TEACHING  Khadijah Em, RN, BSN, CDE  [phone] (Voicemail)  [facility] to [facility] Wednesdays, Thursdays and Fridays      Well balanced diet recommended.    Routine exercise recommended most days during the week.  Wear sunscreen - SPF 15 or higher and reapply every 2 hours as needed.  Wear seat belts and drive safely.  Schedule regular appointments with dentist.  Schedule yearly eye exam if you wear glasses/contacts.  Yearly Flu Vaccine recommended in the fall  Tetanus, Diptheria and Pertussis vaccine should be given every 7-10 years.  Call or come in if there are concerns regarding domestic abuse, sexually transmitted diseases, alcohol/drug addiction, depression/anxiety issues, or any further concerns.        Preventing Osteoporosis: Meeting Your Calcium Needs    Your body needs calcium to build and repair bones. But it can't make calcium on its own. That's why it's important to eat calcium-rich foods. Some foods are naturally rich in calcium. Others have calcium added (fortified). It's best to get calcium from the foods you eat. But if you can't get enough, you may want to take calcium supplements. To meet your daily calcium needs, try the foods listed below.  Dairy Fish & beans Other sources      Source   Calcium (mg) per serving   Source   Calcium (mg) per serving   Source   Calcium (mg) per serving      Low-fat yogurt, plain   415 mg/8 oz.   Sardines, Atlantic, canned, with bones   351 mg/3 oz.   Oatmeal, instant, fortified   215 mg/1 cup   Nonfat milk   302 mg/1 cup   [place], sockeye,  canned, with bones   239 mg/3 oz.   Tofu made with calcium sulfate   204 mg/3 oz.   Low-fat milk   297 mg/1 cup   Soybeans, fresh, boiled   131 mg/1/2 cup   Collards   179 mg/1/2 cup   Swiss cheese   272 mg/1 oz.   White beans, cooked   81 mg/1/2 cup   English muffin, whole wheat   175 mg/1 muffin   Cheddar cheese   205 mg/1 oz.   Navy beans, cooked   79 mg/1/2 cup   Kale   90 mg/1/2 cup   Ice cream strawberry   79 mg/1/2 cup           Orange, navel   56 mg/1 medium   Note: Calcium levels may vary depending on brand and size.  Daily calcium needs  14-18 years old: 1,300 mg  19-30 years old: 1,000 mg  31-50 years old: 1,000 mg  51-70 years old, women: 1,200 mg  51-70 years old, men: 1,000 mg  Pregnant or nursin-28 years old: 1,300 mg, 19-50 years old: 1,000 mg  Older than 70 (women and men): 1,200 mg   Date Last Reviewed: 10/17/2015  © 7092-3549 [employer] [address] [place] [address], [place] [address]. All rights reserved. This information is not intended as a substitute for professional medical care. Always follow your healthcare professional's instructions.        Prevention Guidelines, Women Ages 40 to 49  Screening tests and vaccines are an important part of managing your health. Health counseling is essential, too. Below are guidelines for these, for women ages 40 to 49. Talk with your healthcare provider to make sure you’re up-to-date on what you need.  Screening Who needs it How often   Type 2 diabetes or prediabetes All adults beginning at age 45 and adults without symptoms at any age who are overweight or obese and have 1 or more additional risk factors for diabetes At least every 3 years   Alcohol misuse All women in this age group At routine exams   Blood pressure All women in this age group Every 2 years if your blood pressure is less than 120/80 mm Hg; yearly if your systolic blood pressure is 120 to 139 mm Hg, or your diastolic blood pressure reading is 80 to 89 mm Hg   Breast cancer  All women in this age group Yearly mammogram and clinical breast exam2  Each woman should make her own decision. If a woman decides to have mammograms before age 50, they should be done every 2 years.3   Cervical cancer All women in this age group, except women who have had a complete hysterectomy Pap test every 3 years or Pap test plus human papilloma virus (HPV) test every 5 years   Chlamydia Women at increased risk for infection At routine exams if you're at risk or have symptoms   Depression All women in this age group At routine exams   Gonorrhea Sexually active women at increased risk for infection At routine exams   Hepatitis C Anyone at increased risk; 1 time for those born between 1945 and 1965 At routine exams   High cholesterol or triglycerides All women ages 45 and older who are at risk for coronary artery disease; younger women, talk with your healthcare provider At least every 5 years   HIV All women At routine exams   Obesity All women in this age group At routine exams   Syphilis Women at increased risk for infection - talk with your healthcare provider At routine exams   Tuberculosis Women at increased risk for infection - talk with your healthcare provider Ask your healthcare provider   Vision All women in this age group Complete exam at age 40 and eye exams every 2 to 4 years. If you have a chronic disease, ask your healthcare provider how often you should have your eyes examined.4   Vaccine Who needs it How often   Chickenpox (varicella) All women in this age group who have no record of this infection or vaccine 2 doses; the second dose should be given at least 4 weeks after the first dose   Hepatitis A Women at increased risk for infection - talk with your healthcare provider 2 doses given 6 months apart   Hepatitis B Women at increased risk for infection - talk with your healthcare provider 3 doses over 6 months; second dose should be given 1 month after the first dose; the third dose should be  given at least 2 months after the second dose and at least 4 months after the first dose   Haemophilus influenzae Type B (HIB) Women at increased risk 1 to 3 doses   Influenza (flu) All women in this age group Once a year   Measles, mumps, rubella (MMR) All women in this age group who have no record of these infections or vaccines 1 or 2 doses   Meningococcal Women at increased risk for infection - talk with your healthcare provider 1 or more doses   Pneumococcal conjugate vaccine (PCV13) and pneumococcal polysaccharide vaccine (PPSV23) Women at increased risk for infection - talk with your healthcare provider 1 or 2 doses   Tetanus/diphtheria/pertussis (Td/Tdap) booster All women in this age group A one-time dose of Tdap instead of a Td booster after age 18, then Td every 10 years   Counseling Who needs it How often   BRCA gene mutation testing for breast and ovarian cancer susceptibility Women with increased risk for having gene mutation When your risk is known   Breast cancer and chemoprevention Women at high risk for breast cancer When your risk is known   Diet and exercise Women who are overweight or obese When diagnosed, and then at routine exams   Domestic violence Women at the age in which they are able to have children At routine exams   Sexually transmitted infection prevention Women at increased risk for infection - talk with your healthcare provider At routine exams   Use of tobacco and the health effects it can cause All women in this age group Every exam   1American Diabetes Association  2American Cancer Society  3U.S. Preventive Services Task Force  4[facility] [employer] of [employer]mology  Date Last Reviewed: 8/27/2015  © 2552-5396 [employer]. [address] [place] [address], [place] [address]. All rights reserved. This information is not intended as a substitute for professional medical care. Always follow your healthcare professional's instructions.

## 2024-07-22 DIAGNOSIS — E78.00 HYPERCHOLESTEROLEMIA: Primary | ICD-10-CM

## 2024-08-09 ENCOUNTER — PATIENT MESSAGE (OUTPATIENT)
Dept: FAMILY MEDICINE CLINIC | Facility: CLINIC | Age: 44
End: 2024-08-09

## 2024-08-09 NOTE — TELEPHONE ENCOUNTER
Patient stopped taking metformin 3 days ago, she was having constipation and stomach discomfort, she believes it was the metformin causing this.    The rosuvastatin was ok, she has never stopped taking it    She started her new medication July 27th

## 2024-08-09 NOTE — TELEPHONE ENCOUNTER
From: Andressa Molina  To: Jeaneth Queen  Sent: 8/9/2024 2:52 PM CDT  Subject: Metformin    Hi Dr. Chambers,    I stopped my Metformin 3 days now because I feel so bloated, constipated and too much stomach discomfort. I stopped taking it just to check if this is the medication that is making me feel all of those discomforts and I think it was. Rosuvastatin was okay, I never stopped taking it. I started my new medications July 27 because I went for a vacation right after my checkup. Anyway, hoping to hear from you soon.     Thank you,  Andressa

## 2024-08-19 RX ORDER — METFORMIN HYDROCHLORIDE 500 MG/1
TABLET, EXTENDED RELEASE ORAL
Qty: 180 TABLET | Refills: 0 | OUTPATIENT
Start: 2024-08-19

## 2024-08-19 RX ORDER — METFORMIN HYDROCHLORIDE 500 MG/1
1000 TABLET, EXTENDED RELEASE ORAL 2 TIMES DAILY WITH MEALS
Qty: 120 TABLET | Refills: 2 | Status: SHIPPED | OUTPATIENT
Start: 2024-08-19

## 2024-08-19 RX ORDER — METFORMIN HYDROCHLORIDE 500 MG/1
500 TABLET, EXTENDED RELEASE ORAL 2 TIMES DAILY WITH MEALS
Qty: 120 TABLET | Refills: 0 | Status: SHIPPED | OUTPATIENT
Start: 2024-08-19 | End: 2024-08-19

## 2024-08-19 NOTE — TELEPHONE ENCOUNTER
Ok to stop metformin immediate release  Will try metformin xr  Pls Rx Metformin 500 mg XR, to take 2 tabs BID. Start with 1 tab daily x 3 days, then 1 tab BID x 3 days then 2 tabs BID    Pt needs OV in 1-2 months for recheck  Pt also needs recheck lipids since she recently started rosuvastatin      Pt to call if with any medication problems or concerns

## 2024-08-24 ENCOUNTER — LAB ENCOUNTER (OUTPATIENT)
Dept: LAB | Age: 44
End: 2024-08-24
Attending: EMERGENCY MEDICINE
Payer: COMMERCIAL

## 2024-08-24 DIAGNOSIS — E78.00 HYPERCHOLESTEROLEMIA: ICD-10-CM

## 2024-08-24 LAB
ALBUMIN SERPL-MCNC: 4.9 G/DL (ref 3.2–4.8)
ALBUMIN/GLOB SERPL: 1.6 {RATIO} (ref 1–2)
ALP LIVER SERPL-CCNC: 86 U/L
ALT SERPL-CCNC: 76 U/L
ANION GAP SERPL CALC-SCNC: 3 MMOL/L (ref 0–18)
AST SERPL-CCNC: 37 U/L (ref ?–34)
BILIRUB SERPL-MCNC: 0.5 MG/DL (ref 0.3–1.2)
BUN BLD-MCNC: 10 MG/DL (ref 9–23)
CALCIUM BLD-MCNC: 9.9 MG/DL (ref 8.7–10.4)
CHLORIDE SERPL-SCNC: 105 MMOL/L (ref 98–112)
CHOLEST SERPL-MCNC: 228 MG/DL (ref ?–200)
CO2 SERPL-SCNC: 29 MMOL/L (ref 21–32)
CREAT BLD-MCNC: 0.8 MG/DL
EGFRCR SERPLBLD CKD-EPI 2021: 93 ML/MIN/1.73M2 (ref 60–?)
FASTING PATIENT LIPID ANSWER: YES
FASTING STATUS PATIENT QL REPORTED: YES
GLOBULIN PLAS-MCNC: 3 G/DL (ref 2–3.5)
GLUCOSE BLD-MCNC: 109 MG/DL (ref 70–99)
HDLC SERPL-MCNC: 72 MG/DL (ref 40–59)
LDLC SERPL CALC-MCNC: 141 MG/DL (ref ?–100)
NONHDLC SERPL-MCNC: 156 MG/DL (ref ?–130)
OSMOLALITY SERPL CALC.SUM OF ELEC: 284 MOSM/KG (ref 275–295)
POTASSIUM SERPL-SCNC: 4 MMOL/L (ref 3.5–5.1)
PROT SERPL-MCNC: 7.9 G/DL (ref 5.7–8.2)
SODIUM SERPL-SCNC: 137 MMOL/L (ref 136–145)
TRIGL SERPL-MCNC: 89 MG/DL (ref 30–149)
VLDLC SERPL CALC-MCNC: 16 MG/DL (ref 0–30)

## 2024-08-24 PROCEDURE — 36415 COLL VENOUS BLD VENIPUNCTURE: CPT

## 2024-08-24 PROCEDURE — 80061 LIPID PANEL: CPT

## 2024-08-24 PROCEDURE — 80053 COMPREHEN METABOLIC PANEL: CPT

## 2024-09-01 ENCOUNTER — OFFICE VISIT (OUTPATIENT)
Dept: FAMILY MEDICINE CLINIC | Facility: CLINIC | Age: 44
End: 2024-09-01
Payer: COMMERCIAL

## 2024-09-01 VITALS
SYSTOLIC BLOOD PRESSURE: 138 MMHG | RESPIRATION RATE: 16 BRPM | HEART RATE: 88 BPM | OXYGEN SATURATION: 99 % | HEIGHT: 62.6 IN | TEMPERATURE: 98 F | DIASTOLIC BLOOD PRESSURE: 96 MMHG | BODY MASS INDEX: 26.38 KG/M2 | WEIGHT: 147 LBS

## 2024-09-01 DIAGNOSIS — L42 PITYRIASIS ROSEA: Primary | ICD-10-CM

## 2024-09-01 RX ORDER — TRIAMCINOLONE ACETONIDE 1 MG/G
CREAM TOPICAL 2 TIMES DAILY
Qty: 1 EACH | Refills: 0 | Status: SHIPPED | OUTPATIENT
Start: 2024-09-01 | End: 2024-09-15

## 2024-09-01 NOTE — PROGRESS NOTES
CHIEF COMPLAINT:     Chief Complaint   Patient presents with    Rash     Develope a rash around neck and spread through trunk  Used antifungal cream, Clotrimazole OTC   Sx onset +1 wk           HPI:    Andressa Molina is a 43 year old female who presents for evaluation of a rash.  Per patient rash started in the past 1  weeks. Rash has been spreading since onset.  Patient denies similar rash in the past. The rash is characterized by one annular patch to right side of neck initially and then irregularly shaped lesions to neck and upper chest. Patient has treated rash with clotrimazole.  Associated symptoms include: itching, worse with heat  Exposure: none known.      Current Outpatient Medications   Medication Sig Dispense Refill    triamcinolone 0.1 % External Cream Apply topically 2 (two) times daily for 14 days. 1 each 0    metFORMIN  MG Oral Tablet 24 Hr Take 2 tablets (1,000 mg total) by mouth 2 (two) times daily with meals. Start with 1 tablet daily for 3 days then 1 tablet twice a day for 3 days then 2 tablets twice a day 120 tablet 2    rosuvastatin 10 MG Oral Tab Take 1 tablet (10 mg total) by mouth nightly. 90 tablet 0    Omeprazole 40 MG Oral Capsule Delayed Release Take 1 capsule (40 mg total) by mouth daily. Before a meal 30 capsule 2    Glucose Blood (TRUE METRIX BLOOD GLUCOSE TEST) In Vitro Strip E11.9 300 strip 1    Multiple Vitamins-Minerals (MULTIVITAMIN ADULT, MINERALS,) Oral Tab         Past Medical History:    Allergic rhinitis    Esophageal reflux    Heartburn    High cholesterol    Hyperlipidemia    Indigestion    Lateral epicondylitis, right elbow    Problems with swallowing    Thyroid cyst    had benign biopsy    Uncomfortable fullness after meals      Past Surgical History:   Procedure Laterality Date    Appendectomy  22      Family History   Problem Relation Age of Onset    Diabetes Father             Hypertension Father     Diabetes Sister     Diabetes Brother      Diabetes Brother     Diabetes Brother       Social History     Socioeconomic History    Marital status:    Tobacco Use    Smoking status: Never     Passive exposure: Never    Smokeless tobacco: Never   Vaping Use    Vaping status: Never Used   Substance and Sexual Activity    Alcohol use: No    Drug use: No    Sexual activity: Yes     Partners: Male   Other Topics Concern    Caffeine Concern No    Exercise No    Seat Belt No    Special Diet No    Stress Concern No    Weight Concern No     Social Determinants of Health      Received [facility], [facility]    Social Connections    Received from [facility], [facility]    Housing Stability         REVIEW OF SYSTEMS:   GENERAL: feels well otherwise  SKIN: Per HPI.   HEENT: Denies rhinorrhea, edema of the lips or swelling of throat.  CARDIOVASCULAR: Denies chest pains or palpitations.  LUNGS: Denies shortness of breath with exertion or rest. No cough or wheezing.  LYMPH: Denies enlargement of the lymph nodes.        EXAM:   BP (!) 138/96   Pulse 88   Temp 97.7 °F (36.5 °C)   Resp 16   Ht 5' 2.6\" (1.59 m)   Wt 147 lb (66.7 kg)   LMP 09/01/2024 (Exact Date)   SpO2 99%   BMI 26.37 kg/m²   GENERAL: well developed, well nourished,in no apparent distress  SKIN: larger herald patch to right side of neck.  Diffuse irregular shaped lesions to neck, shoulders and upper chest.  EYES: PERRLA, EOMI, conjunctiva are clear  LUNGS: Clear to auscultation bilaterally.  No wheezing, rhonchi, or rales.  No diminished breath sounds. No increased work of breathing.   CARDIO: RRR without murmur  LYMPH: No cervical lymphadenopathy.     ASSESSMENT AND PLAN:   Andressa Molina is a 43 year old female who presents for evaluation of a rash. Findings are consistent with:    ASSESSMENT:  Encounter Diagnosis   Name Primary?    Pityriasis rosea Yes       PLAN: Meds as listed below.  Comfort measures as  described in Patient Instructions.  Skin care discussed with patient.  Follow up with Derm if no improvement or worsening symptoms.     Meds & Refills for this Visit:  Requested Prescriptions     Signed Prescriptions Disp Refills    triamcinolone 0.1 % External Cream 1 each 0     Sig: Apply topically 2 (two) times daily for 14 days.       Risk and benefits of medication discussed.     The patient indicates understanding of these issues and agrees to the plan.  The patient is asked to see PCP in 3 days if sx's are not improving or if they worsen.    There are no Patient Instructions on file for this visit.

## 2024-10-03 RX ORDER — OMEPRAZOLE 40 MG/1
40 CAPSULE, DELAYED RELEASE ORAL
Qty: 30 CAPSULE | Refills: 2 | Status: SHIPPED | OUTPATIENT
Start: 2024-10-03

## 2024-10-11 ENCOUNTER — HOSPITAL ENCOUNTER (OUTPATIENT)
Dept: CT IMAGING | Age: 44
Discharge: HOME | End: 2024-10-11
Attending: EMERGENCY MEDICINE

## 2024-10-11 DIAGNOSIS — E78.01 FAMILIAL HYPERCHOLESTEROLEMIA: ICD-10-CM

## 2024-10-11 NOTE — PROGRESS NOTES
Date of Service 10/11/2024    FREDDIE DODD  Date of Birth 9/22/1980    Patient Age: 44 year old    PCP: Jeaneth Queen MD  [address]  [facility] [address]    Heart Scan Consult  Preliminary Heart Scan Score: 0    Previous Screening  Heart Scan Completed Previously: No                   Risk Factors  Personal Risk Factors  Alterable Risk Factors: Abnormal Cholesterol;Lack of exercise;Diabetes      Body Mass Index  BMI 26    Blood Pressure  /80 no med.  (Normal =< 120/80,  Elevated = 120-129/ >80,  High Stage1 130-139/80-89 , Stage2 >140/>90)    Lipid Profile  Cholesterol: 228, done on 8/24/2024.  HDL Cholesterol: 72, done on 8/24/2024.  LDL Cholesterol: 141, done on 8/24/2024.  TriGlycerides 89, done on 8/24/2024.  She started cholesterol medication in July - at this time her LDL was 219.    Cholesterol Goals  Value   Total  =< 200   HDL  = > 45 Men = > 55 Women   LDL   =< 100   Triglycerides  =< 150       Glucose and Hemoglobin A1C   Had been Prediabetic and recently diagnosed with Type II, on Metformin.  Lab Results   Component Value Date     (H) 08/24/2024    A1C 7.6 (A) 07/18/2024     (Normal Fasting Glucose < 100mg/dl )    Nurse Review  Risk factor information and results reviewed with Nurse: Yes    Recommended Follow Up:  Consult your physician regarding:: Final Heart Scan Report;Discuss potential for Incidental Finding      Recommendations for Change:  Nutrition Changes: Low Saturated Fat;Increase Fiber    Cholesterol Modification (goal of therapy depends upon your risk): Decrease LDL (Lousy/Bad) Ideal <100    Exercise: Initiate Program    Smoking Cessation: No Change Needed    Weight Management: Decrease Current Weight    Stress Management: Adopt Stress Management Techniques    Repeat Heart Scan: 5 years if Calcium Score is 0.0;Discuss with your Physician              [place] Recommended Resources:  Recommended Resources: Upcoming Classes, Medical Services and Health Library  [email or web address]            Melissa AQUINO RN        Please Contact the Nurse Heart Line with any Questions or Concerns [phone].

## 2024-11-04 ENCOUNTER — OFFICE VISIT (OUTPATIENT)
Dept: FAMILY MEDICINE CLINIC | Facility: CLINIC | Age: 44
End: 2024-11-04
Payer: COMMERCIAL

## 2024-11-04 VITALS
BODY MASS INDEX: 26.87 KG/M2 | WEIGHT: 146 LBS | HEIGHT: 62 IN | DIASTOLIC BLOOD PRESSURE: 80 MMHG | RESPIRATION RATE: 16 BRPM | HEART RATE: 96 BPM | SYSTOLIC BLOOD PRESSURE: 110 MMHG | OXYGEN SATURATION: 98 %

## 2024-11-04 DIAGNOSIS — Z12.11 SCREENING FOR COLON CANCER: ICD-10-CM

## 2024-11-04 DIAGNOSIS — E11.9 CONTROLLED TYPE 2 DIABETES MELLITUS WITHOUT COMPLICATION, WITHOUT LONG-TERM CURRENT USE OF INSULIN (HCC): Primary | ICD-10-CM

## 2024-11-04 DIAGNOSIS — E78.01 FAMILIAL HYPERCHOLESTEROLEMIA: ICD-10-CM

## 2024-11-04 LAB
CARTRIDGE LOT#: ABNORMAL NUMERIC
HEMOGLOBIN A1C: 7.5 % (ref 4.3–5.6)

## 2024-11-04 RX ORDER — ROSUVASTATIN CALCIUM 20 MG/1
20 TABLET, COATED ORAL NIGHTLY
Qty: 90 TABLET | Refills: 1 | Status: SHIPPED | OUTPATIENT
Start: 2024-11-04

## 2024-11-04 RX ORDER — ROSUVASTATIN CALCIUM 10 MG/1
10 TABLET, COATED ORAL NIGHTLY
Qty: 90 TABLET | Refills: 0 | Status: CANCELLED | OUTPATIENT
Start: 2024-11-04

## 2024-11-04 RX ORDER — METFORMIN HYDROCHLORIDE 500 MG/1
500 TABLET, EXTENDED RELEASE ORAL 2 TIMES DAILY WITH MEALS
Qty: 180 TABLET | Refills: 0 | Status: SHIPPED | OUTPATIENT
Start: 2024-11-04

## 2024-11-04 NOTE — PATIENT INSTRUCTIONS
Thank you for choosing [facility]  To Do:  FOR FREDDIE DODD    Recommend follow up with fertility specialist Dr Silverman  Increase rosuvastatin to 20 mg  Repeat Lipid and CMP in 1-2 months  Start Januvia for DM  Continue with Metformin 500 BID  MOnito blood sugars daily  Need to take metformin 2 x a day, avoid missed doses  Complete Cologuard test   Arrange for mammogram  Follow up in 3 months

## 2024-11-04 NOTE — PROGRESS NOTES
Chief Complaint:   Chief Complaint   Patient presents with    Follow - Up     HPI:   This is a 44 year old female         DIABETES  Patient on last office visit was restarted on metformin, rosuvastatin.  Tolerating only metformin 500 mg BID  Stomach upset with higher doses  But patient admits that she also usually misses the second dose of metformin most of the time.  At best she only takes metformin 500 mg once a day.    Patient also reports that her brother had a recent stroke and is not hemiplegic at the age of 58    PMSH       Past Medical History:    Allergic rhinitis    Esophageal reflux    Heartburn    High cholesterol    Hyperlipidemia    Indigestion    Lateral epicondylitis, right elbow    Problems with swallowing    Thyroid cyst    had benign biopsy    Uncomfortable fullness after meals     Past Surgical History:   Procedure Laterality Date    Appendectomy  22     Social History:  Social History     Socioeconomic History    Marital status:    Tobacco Use    Smoking status: Never     Passive exposure: Never    Smokeless tobacco: Never   Vaping Use    Vaping status: Never Used   Substance and Sexual Activity    Alcohol use: No    Drug use: No    Sexual activity: Yes     Partners: Male   Other Topics Concern    Caffeine Concern No    Exercise No    Seat Belt No    Special Diet No    Stress Concern No    Weight Concern No     Social Drivers of Health      [facility]    Social Connections    Received from [facility], [facility]    Housing Stability     Family History:  Family History   Problem Relation Age of Onset    Diabetes Father             Hypertension Father     Diabetes Sister     Diabetes Brother     Diabetes Brother     Diabetes Brother      Allergies:  Allergies[1]  Current Meds:  Medications Ordered Prior to Encounter[2]   Counseling given: Not Answered         PROBLEM  LIST     Patient Active Problem List   Diagnosis    COVID-19 virus infection ( 4/20/2020)    Hypercholesterolemia    Prediabetes    Chronic GERD    Inability to conceive, female    Thyroid nodule    Chronic heartburn    GERD with esophagitis    Irregular Z line of esophagus    Acute appendicitis, unspecified acute appendicitis type    Multiple thyroid nodules    Familial hypercholesterolemia    Controlled type 2 diabetes mellitus without complication, without long-term current use of insulin (HCC)           PHYSICAL EXAM:   /80   Pulse 96   Resp 16   Ht 5' 2\" (1.575 m)   Wt 146 lb (66.2 kg)   LMP 09/01/2024 (Exact Date)   SpO2 98%   BMI 26.70 kg/m²  Estimated body mass index is 26.7 kg/m² as calculated from the following:    Height as of this encounter: 5' 2\" (1.575 m).    Weight as of this encounter: 146 lb (66.2 kg).   Vital signs reviewed.Appears stated age, well groomed.  GENERAL: well developed, well nourished, well hydrated, no distress  SKIN: good skin turgor, no obvious rashes      Recent Results (from the past 4 weeks)   POC Hemoglobin A1C    Collection Time: 11/04/24  3:01 PM   Result Value Ref Range    HEMOGLOBIN A1C 7.5 (A) 4.3 - 5.6 %    Cartridge Lot# [id number] Numeric    Cartridge Expiration Date 11/12/2025 Date               ASSESSMENT AND PLAN:         1. Controlled type 2 diabetes mellitus without complication, without long-term current use of insulin (HCC)  - metFORMIN  MG Oral Tablet 24 Hr; Take 1 tablet (500 mg total) by mouth 2 (two) times daily with meals.  Dispense: 180 tablet; Refill: 0  - POC Hemoglobin A1C  - rosuvastatin 20 MG Oral Tab; Take 1 tablet (20 mg total) by mouth nightly.  Dispense: 90 tablet; Refill: 1  - SITagliptin Phosphate (JANUVIA) 100 MG Oral Tab; Take 1 tablet (100 mg total) by mouth daily.  Dispense: 90 tablet; Refill: 0  Encouraged to be compliant with metformin  Will add Januvia  Follow up in 3 months  2. Screening for colon cancer  - COLOGUARD COLON  CANCER SCREENING (EXTERNAL)    3. Familial hypercholesterolemia  On Rosuvastatin   Developed Muscle ache with atorvastatin  Doing well with rosuvastatin  LDL not at goal will increase rosuvastatin to 20 mg  Repeat labs in 1-2 months      PATIENT INSTRUCTIONS:    Recommend follow up with fertility specialist Dr Silverman  Increase rosuvastatin to 20 mg  Repeat Lipid and CMP in 1-2 months  Start Januvia for DM  Continue with Metformin 500 BID  MOnito blood sugars daily  Need to take metformin 2 x a day, avoid missed doses  Complete Cologuard test   Arrange for mammogram  Follow up in 3 months    FOLLOW UP: 3 months          Health Maintenance Due   Topic Date Due    Diabetes Care Foot Exam  Never done    Colorectal Cancer Screening  Never done    Pneumococcal Vaccine: Birth to 64yrs (1 of 2 - PCV) Never done    Mammogram  08/11/2024    COVID-19 Vaccine (5 - 2023-24 season) 09/01/2024    Influenza Vaccine (1) 10/01/2024    HTN: BP Follow-Up  10/01/2024              [1] No Known Allergies  [2]   Current Outpatient Medications on File Prior to Visit   Medication Sig Dispense Refill    OMEPRAZOLE 40 MG Oral Capsule Delayed Release TAKE 1 CAPSULE(40 MG) BY MOUTH DAILY BEFORE A MEAL 30 capsule 2    Glucose Blood (TRUE METRIX BLOOD GLUCOSE TEST) In Vitro Strip E11.9 300 strip 1    Multiple Vitamins-Minerals (MULTIVITAMIN ADULT, MINERALS,) Oral Tab        No current facility-administered medications on file prior to visit.

## 2024-11-22 RX ORDER — OMEPRAZOLE 40 MG/1
40 CAPSULE, DELAYED RELEASE ORAL
Qty: 30 CAPSULE | Refills: 2 | Status: SHIPPED | OUTPATIENT
Start: 2024-11-22

## 2024-12-06 NOTE — ED INITIAL ASSESSMENT (HPI)
C/o lower abd pain  since last night. No urinary symptoms. Tolerated food well this morning.
Communicable/Infectious

## 2025-02-07 ENCOUNTER — OFFICE VISIT (OUTPATIENT)
Dept: FAMILY MEDICINE CLINIC | Facility: CLINIC | Age: 45
End: 2025-02-07
Payer: COMMERCIAL

## 2025-02-07 VITALS
OXYGEN SATURATION: 98 % | WEIGHT: 146 LBS | SYSTOLIC BLOOD PRESSURE: 110 MMHG | HEIGHT: 62 IN | RESPIRATION RATE: 15 BRPM | BODY MASS INDEX: 26.87 KG/M2 | HEART RATE: 90 BPM | DIASTOLIC BLOOD PRESSURE: 80 MMHG

## 2025-02-07 DIAGNOSIS — R74.8 ELEVATED LIVER ENZYMES: ICD-10-CM

## 2025-02-07 DIAGNOSIS — E11.9 CONTROLLED TYPE 2 DIABETES MELLITUS WITHOUT COMPLICATION, WITHOUT LONG-TERM CURRENT USE OF INSULIN (HCC): Primary | ICD-10-CM

## 2025-02-07 LAB — HEMOGLOBIN A1C: 7.9 % (ref 4.3–5.6)

## 2025-02-07 PROCEDURE — 83036 HEMOGLOBIN GLYCOSYLATED A1C: CPT | Performed by: EMERGENCY MEDICINE

## 2025-02-07 PROCEDURE — 99214 OFFICE O/P EST MOD 30 MIN: CPT | Performed by: EMERGENCY MEDICINE

## 2025-02-07 RX ORDER — BLOOD SUGAR DIAGNOSTIC
STRIP MISCELLANEOUS
Qty: 100 STRIP | Refills: 1 | Status: SHIPPED | OUTPATIENT
Start: 2025-02-07 | End: 2026-02-07

## 2025-02-07 RX ORDER — LANCETS 33 GAUGE
1 EACH MISCELLANEOUS DAILY
Qty: 300 EACH | Refills: 1 | Status: SHIPPED | OUTPATIENT
Start: 2025-02-07 | End: 2026-02-07

## 2025-02-07 RX ORDER — LANCETS 28 GAUGE
1 EACH MISCELLANEOUS 2 TIMES DAILY PRN
Qty: 100 EACH | Refills: 3 | Status: CANCELLED | OUTPATIENT
Start: 2025-02-07 | End: 2026-02-07

## 2025-02-07 RX ORDER — BLOOD-GLUCOSE METER
KIT MISCELLANEOUS
Qty: 100 STRIP | Refills: 0 | Status: CANCELLED | OUTPATIENT
Start: 2025-02-07 | End: 2026-02-07

## 2025-02-07 RX ORDER — BLOOD-GLUCOSE METER
1 KIT MISCELLANEOUS 2 TIMES DAILY PRN
Qty: 1 EACH | Refills: 0 | Status: CANCELLED | OUTPATIENT
Start: 2025-02-07 | End: 2026-02-07

## 2025-02-07 RX ORDER — BLOOD SUGAR DIAGNOSTIC
STRIP MISCELLANEOUS
Qty: 300 STRIP | Refills: 1 | Status: SHIPPED | OUTPATIENT
Start: 2025-02-07

## 2025-02-07 NOTE — PATIENT INSTRUCTIONS
Thank you for choosing [facility]  To Do:  FOR FREDDIE DODD    Arrange for mammogram  Complete cologuard test  Have blood test done  Hold rosuvastatin until liver enzymes are rechecked  Start Januvia  Ok to stop Metformin  Follow up in 2-3 months sooner if needed  Consider pneumonia vaccine on next visit  Arrange for abd ultrasound

## 2025-02-07 NOTE — PROGRESS NOTES
Chief Complaint:   Chief Complaint   Patient presents with    Follow - Up    Bloating     HPI:   This is a 44 year old female       DIABETES  C/O bloating and feeling that her belly is getting big since 2 months ago.  In the past had similar Sx when she started metformin  No abd pain  Taking metformin 500 1-2 x a day.   Pt worried/ scared of the bloating.  BM have been regular no constipation  + flatus  Also on rosuvastatin    Never received the Rx of Januvia  Blood sugars ranging 150+          Wt Readings from Last 6 Encounters:   25 146 lb (66.2 kg)   24 146 lb (66.2 kg)   24 147 lb (66.7 kg)   24 147 lb (66.7 kg)   24 144 lb (65.3 kg)   24 144 lb (65.3 kg)             [facility]       Past Medical History:    Allergic rhinitis    Esophageal reflux    Heartburn    High cholesterol    Hyperlipidemia    Indigestion    Lateral epicondylitis, right elbow    Problems with swallowing    Thyroid cyst    had benign biopsy    Uncomfortable fullness after meals     Past Surgical History:   Procedure Laterality Date    Appendectomy  22     Social History:  Social History     Socioeconomic History    Marital status:    Tobacco Use    Smoking status: Never     Passive exposure: Never    Smokeless tobacco: Never   Vaping Use    Vaping status: Never Used   Substance and Sexual Activity    Alcohol use: No    Drug use: No    Sexual activity: Yes     Partners: Male   Other Topics Concern    Caffeine Concern No    Exercise No    Seat Belt No    Special Diet No    Stress Concern No    Weight Concern No     Social Drivers of Health      [facility]    Housing Stability     Family History:  Family History   Problem Relation Age of Onset    Diabetes Father             Hypertension Father     Diabetes Sister     Diabetes Brother     Diabetes Brother     Diabetes Brother      Allergies:  Allergies[1]  Current  Meds:  Medications Ordered Prior to Encounter[2]   Counseling given: Not Answered         PROBLEM LIST     Patient Active Problem List   Diagnosis    COVID-19 virus infection ( 4/20/2020)    Hypercholesterolemia    Prediabetes    Chronic GERD    Inability to conceive, female    Thyroid nodule    Chronic heartburn    GERD with esophagitis    Irregular Z line of esophagus    Acute appendicitis, unspecified acute appendicitis type    Multiple thyroid nodules    Familial hypercholesterolemia    Controlled type 2 diabetes mellitus without complication, without long-term current use of insulin ([facility])           PHYSICAL EXAM:   /80   Pulse 90   Resp 15   Ht 5' 2\" (1.575 m)   Wt 146 lb (66.2 kg)   LMP 02/05/2025 (Exact Date)   SpO2 98%   BMI 26.70 kg/m²  Estimated body mass index is 26.7 kg/m² as calculated from the following:    Height as of this encounter: 5' 2\" (1.575 m).    Weight as of this encounter: 146 lb (66.2 kg).   Vital signs reviewed.Appears stated age, well groomed.  GENERAL: well developed, well nourished, well hydrated, no distress  SKIN: good skin turgor, no obvious rashes  HEENT: atraumatic, normocephalic, ears, nose and throat are clear  EYES: sclera non icteric bilateral  NECK: supple, no adenopathy, no thyromegaly  LUNGS: clear to auscultation, no RRW  CARDIO: RRR without murmur  EXTREMITIES: no cyanosis, clubbing or edema  GI:soft Nontender Normoactive BS.  No palpable masses no guarding rigidity no rebound tenderness    Recent Results (from the past 4 weeks)   POC Hemoglobin A1C    Collection Time: 02/07/25  2:26 PM   Result Value Ref Range    HEMOGLOBIN A1C 7.9 (A) 4.3 - 5.6 %    Cartridge Lot# [id number] Numeric    Cartridge Expiration Date 10/11/2026 Date           ASSESSMENT AND PLAN:         1. Controlled type 2 diabetes mellitus without complication, without long-term current use of insulin (HCC)  - POC Hemoglobin A1C  - Microalb/Creat Ratio, Random Urine; Future  - Lipid Panel;  Future  - Glucose Blood (ONETOUCH VERIO) In Vitro Strip; Check blood sugars once a day  Dispense: 300 strip; Refill: 1  - Glucose Blood (ONETOUCH VERIO) In Vitro Strip; Use as directed.  Dispense: 100 strip; Refill: 1  - OneTouch Delica Lancets 33G Does not apply Misc; 1 Lancet by Finger stick route daily.  Dispense: 300 each; Refill: 1  - SITagliptin Phosphate (JANUVIA) 100 MG Oral Tab; Take 1 tablet (100 mg total) by mouth daily.  Dispense: 90 tablet; Refill: 0    2. Elevated liver enzymes  - Comp Metabolic Panel (14) [E]; Future  - US ABDOMEN COMPLETE (CPT=76700); Future        PATIENT INSTRUCTIONS:    Arrange for mammogram  Complete cologuard test  Have blood test done  Hold rosuvastatin until liver enzymes are rechecked  Start Januvia  Ok to stop Metformin, due to bloating  Follow up in 2-3 months sooner if needed  Consider pneumonia vaccine on next visit  Arrange for abd ultrasound  FOLLOW UP: 2-3 months                   [1] No Known Allergies  [2]   Current Outpatient Medications on File Prior to Visit   Medication Sig Dispense Refill    Omeprazole 40 MG Oral Capsule Delayed Release Take 1 capsule (40 mg total) by mouth before breakfast. 30 capsule 2    metFORMIN  MG Oral Tablet 24 Hr Take 1 tablet (500 mg total) by mouth 2 (two) times daily with meals. 180 tablet 0    rosuvastatin 20 MG Oral Tab Take 1 tablet (20 mg total) by mouth nightly. 90 tablet 1    Glucose Blood (TRUE METRIX BLOOD GLUCOSE TEST) In Vitro Strip E11.9 300 strip 1    Multiple Vitamins-Minerals (MULTIVITAMIN ADULT, MINERALS,) Oral Tab        No current facility-administered medications on file prior to visit.

## 2025-02-11 ENCOUNTER — LAB ENCOUNTER (OUTPATIENT)
Dept: LAB | Age: 45
End: 2025-02-11
Attending: EMERGENCY MEDICINE
Payer: COMMERCIAL

## 2025-02-11 DIAGNOSIS — E11.9 CONTROLLED TYPE 2 DIABETES MELLITUS WITHOUT COMPLICATION, WITHOUT LONG-TERM CURRENT USE OF INSULIN (HCC): ICD-10-CM

## 2025-02-11 DIAGNOSIS — R74.8 ELEVATED LIVER ENZYMES: ICD-10-CM

## 2025-02-11 LAB
ALBUMIN SERPL-MCNC: 4.4 G/DL (ref 3.2–4.8)
ALBUMIN/GLOB SERPL: 1.4 {RATIO} (ref 1–2)
ALP LIVER SERPL-CCNC: 93 U/L
ALT SERPL-CCNC: 61 U/L
ANION GAP SERPL CALC-SCNC: 8 MMOL/L (ref 0–18)
AST SERPL-CCNC: 34 U/L (ref ?–34)
BILIRUB SERPL-MCNC: 0.5 MG/DL (ref 0.3–1.2)
BUN BLD-MCNC: 12 MG/DL (ref 9–23)
CALCIUM BLD-MCNC: 9.4 MG/DL (ref 8.7–10.6)
CHLORIDE SERPL-SCNC: 101 MMOL/L (ref 98–112)
CHOLEST SERPL-MCNC: 233 MG/DL (ref ?–200)
CO2 SERPL-SCNC: 27 MMOL/L (ref 21–32)
CREAT BLD-MCNC: 0.86 MG/DL
CREAT UR-SCNC: 62 MG/DL
EGFRCR SERPLBLD CKD-EPI 2021: 85 ML/MIN/1.73M2 (ref 60–?)
FASTING PATIENT LIPID ANSWER: YES
FASTING STATUS PATIENT QL REPORTED: YES
GLOBULIN PLAS-MCNC: 3.2 G/DL (ref 2–3.5)
GLUCOSE BLD-MCNC: 138 MG/DL (ref 70–99)
HDLC SERPL-MCNC: 71 MG/DL (ref 40–59)
LDLC SERPL CALC-MCNC: 139 MG/DL (ref ?–100)
MICROALBUMIN UR-MCNC: <0.3 MG/DL
NONHDLC SERPL-MCNC: 162 MG/DL (ref ?–130)
OSMOLALITY SERPL CALC.SUM OF ELEC: 284 MOSM/KG (ref 275–295)
POTASSIUM SERPL-SCNC: 4.1 MMOL/L (ref 3.5–5.1)
PROT SERPL-MCNC: 7.6 G/DL (ref 5.7–8.2)
SODIUM SERPL-SCNC: 136 MMOL/L (ref 136–145)
TRIGL SERPL-MCNC: 132 MG/DL (ref 30–149)
VLDLC SERPL CALC-MCNC: 24 MG/DL (ref 0–30)

## 2025-02-11 PROCEDURE — 82043 UR ALBUMIN QUANTITATIVE: CPT

## 2025-02-11 PROCEDURE — 36415 COLL VENOUS BLD VENIPUNCTURE: CPT

## 2025-02-11 PROCEDURE — 80053 COMPREHEN METABOLIC PANEL: CPT

## 2025-02-11 PROCEDURE — 80061 LIPID PANEL: CPT

## 2025-02-11 PROCEDURE — 82570 ASSAY OF URINE CREATININE: CPT

## 2025-02-13 ENCOUNTER — PATIENT MESSAGE (OUTPATIENT)
Dept: FAMILY MEDICINE CLINIC | Facility: CLINIC | Age: 45
End: 2025-02-13

## 2025-02-13 NOTE — TELEPHONE ENCOUNTER
Dr Deguzaman - Januvia too expensive. Are there any other medications patient can inquire about with insurance?    Patient received Onetouch Verio test strips and Lancets but no glucometer.   She is requesting Accucheck but that is not on insurance formulary. OK to order Onetouch glucometer?

## 2025-02-16 RX ORDER — BLOOD-GLUCOSE METER
1 EACH MISCELLANEOUS DAILY
Qty: 1 KIT | Refills: 0 | Status: SHIPPED | OUTPATIENT
Start: 2025-02-16

## 2025-02-27 DIAGNOSIS — E78.01 FAMILIAL HYPERCHOLESTEROLEMIA: Primary | ICD-10-CM

## 2025-02-27 DIAGNOSIS — E78.00 HYPERCHOLESTEROLEMIA: ICD-10-CM

## 2025-03-03 RX ORDER — OMEPRAZOLE 40 MG/1
40 CAPSULE, DELAYED RELEASE ORAL
Qty: 30 CAPSULE | Refills: 0 | Status: SHIPPED | OUTPATIENT
Start: 2025-03-03

## 2025-03-03 NOTE — TELEPHONE ENCOUNTER
Refill request melida Omeprazole, last Office visit on 7/1/24, Dr. Barney advised patient to follow up in 3-4 weeks and she didn't.  Will require an appt for refills. Will send in a 30 day supply to get her thru until an appt.

## 2025-03-04 ENCOUNTER — HOSPITAL ENCOUNTER (OUTPATIENT)
Dept: ULTRASOUND IMAGING | Age: 45
Discharge: HOME | End: 2025-03-04
Attending: EMERGENCY MEDICINE
Payer: COMMERCIAL

## 2025-03-04 DIAGNOSIS — R74.8 ELEVATED LIVER ENZYMES: ICD-10-CM

## 2025-03-04 PROCEDURE — 76700 US EXAM ABDOM COMPLETE: CPT | Performed by: EMERGENCY MEDICINE

## 2025-05-02 DIAGNOSIS — E11.9 CONTROLLED TYPE 2 DIABETES MELLITUS WITHOUT COMPLICATION, WITHOUT LONG-TERM CURRENT USE OF INSULIN (HCC): ICD-10-CM

## 2025-05-05 RX ORDER — ROSUVASTATIN CALCIUM 20 MG/1
20 TABLET, COATED ORAL NIGHTLY
Qty: 90 TABLET | Refills: 3 | Status: SHIPPED | OUTPATIENT
Start: 2025-05-05

## 2025-05-06 ENCOUNTER — OFFICE VISIT (OUTPATIENT)
Dept: FAMILY MEDICINE CLINIC | Facility: CLINIC | Age: 45
End: 2025-05-06
Payer: COMMERCIAL

## 2025-05-06 VITALS
HEART RATE: 100 BPM | DIASTOLIC BLOOD PRESSURE: 72 MMHG | OXYGEN SATURATION: 98 % | SYSTOLIC BLOOD PRESSURE: 114 MMHG | WEIGHT: 144 LBS | BODY MASS INDEX: 26.5 KG/M2 | HEIGHT: 62 IN | RESPIRATION RATE: 16 BRPM

## 2025-05-06 DIAGNOSIS — E78.01 FAMILIAL HYPERCHOLESTEROLEMIA: ICD-10-CM

## 2025-05-06 DIAGNOSIS — R74.8 ELEVATED LIVER ENZYMES: Primary | ICD-10-CM

## 2025-05-06 DIAGNOSIS — E11.9 CONTROLLED TYPE 2 DIABETES MELLITUS WITHOUT COMPLICATION, WITHOUT LONG-TERM CURRENT USE OF INSULIN (HCC): ICD-10-CM

## 2025-05-06 DIAGNOSIS — K82.8 GALLBLADDER SLUDGE: ICD-10-CM

## 2025-05-06 DIAGNOSIS — E78.00 HYPERCHOLESTEROLEMIA: ICD-10-CM

## 2025-05-06 PROCEDURE — 99214 OFFICE O/P EST MOD 30 MIN: CPT | Performed by: EMERGENCY MEDICINE

## 2025-05-06 RX ORDER — METFORMIN HYDROCHLORIDE 500 MG/1
1000 TABLET, EXTENDED RELEASE ORAL 2 TIMES DAILY WITH MEALS
Qty: 360 TABLET | Refills: 0 | Status: SHIPPED | OUTPATIENT
Start: 2025-05-06 | End: 2025-08-04

## 2025-05-06 NOTE — PROGRESS NOTES
Subjective:   Andressa Molina is a 44 year old female who presents for Test Results (Pt here to discuss US results. )         History/Other:   History of Present Illness  Andressa Molina is a 44 year old female with fatty liver and elevated liver enzymes who presents for review of ultrasound findings and management of gallbladder sludge.    An ultrasound revealed fatty liver and gallbladder sludge. She experiences no significant right-sided abdominal pain or stomach ache, only occasional mild discomfort with food intake, mostly none. No consistent pain or discomfort after eating fatty foods.    She is currently taking rosuvastatin 20 mg every other day for hyperlipidemia. No muscle pain or side effects from rosuvastatin, unlike atorvastatin which caused  muscle discomfort. Her liver enzymes have been elevated since starting statin therapy, raising concerns about whether this is due to the medication or gallbladder issues.    She has a history of GERD and is currently taking omeprazole. She experiences acid reflux, especially after consuming large meals or tomato-based foods, describing it as 'acid talaga' and 'maasim'.    Her last A1c is 7.9, and she has not been taking Januvia due to cost. She reports fasting blood sugars around 130 mg/dL. She previously stopped metformin due to gastrointestinal side effects.   Chief Complaint Reviewed and Verified  Nursing Notes Reviewed and   Verified  Allergies Reviewed  Medications Reviewed  OB Status Reviewed           Tobacco:  She has never smoked tobacco.    Current Medications[1]        Objective:   /72   Pulse 100   Resp 16   Ht 5' 2\" (1.575 m)   Wt 144 lb (65.3 kg)   LMP 04/26/2025 (Exact Date)   SpO2 98%   BMI 26.34 kg/m²  Estimated body mass index is 26.34 kg/m² as calculated from the following:    Height as of this encounter: 5' 2\" (1.575 m).    Weight as of this encounter: 144 lb (65.3 kg).  Physical Exam  Results  LABS  A1c: 7.9  (02/2025)  Blood glucose: 130 (05/06/2025)          US ABDOMEN COMPLETE (CPT=76700)  Result Date: 3/4/2025  PROCEDURE:  US ABDOMEN COMPLETE (CPT=76700)  COMPARISON:  None.  INDICATIONS:  R74.8 Elevated liver enzymes  TECHNIQUE:  Real time gray-scale ultrasound was used to evaluate the abdomen.  The exam includes images of the liver, gallbladder, common bile duct, pancreas, spleen, kidneys, IVC, and aorta.  PATIENT STATED HISTORY: (As transcribed by Technologist)  Patient with elevated liver enzymes.    FINDINGS:  Liver: The liver is visualized and is normal in shape and contour.  Hepatic steatosis  Biliary Tree:  The common bile duct diameter measures 3 mm. No intrahepatic biliary dilatation.  Gallbladder: The gallbladder is visualized.  Gallbladder sludge is noted.  There is no gall bladder wall thickening. The gallbladder wall measures 2 mm. No Gomez's sign was elicited during this examination. There is no pericholecystic fluid present.  Pancreas: The neck and the body of the pancreas is visualized and appears within normal limits. The head and tail of the pancreas are somewhat obscured by bowel gas, limiting evaluation.  Spleen: The spleen is not enlarged and has normal shape and contour.  Kidneys: The right kidney measures 9.4 cm in length and the left kidney measures 9.4 cm in length. There is no hydronephrosis..  IVC and Aorta: The visualized IVC and aorta appear normal in caliber             CONCLUSION:  1. Hepatic steatosis. 2. Gallbladder sludge without sonographic evidence of acute cholecystitis.   LOCATION:  [place]    Dictated by (CST): Sergio Henderson MD on 3/04/2025 at 10:29 AM     Finalized by (CST): Sergio Henderson MD on 3/04/2025 at 10:29 AM             Assessment & Plan:   1. Elevated liver enzymes (Primary)  -     SURGERY - INTERNAL  2. Familial hypercholesterolemia  -     Lipid Panel; Future; Expected date: 05/06/2025  -     Comp Metabolic Panel (14); Future; Expected date: 05/06/2025  3.  Hypercholesterolemia  -     Lipid Panel; Future; Expected date: 05/06/2025  -     Comp Metabolic Panel (14); Future; Expected date: 05/06/2025  4. Controlled type 2 diabetes mellitus without complication, without long-term current use of insulin (HCC)  -     metFORMIN HCl ER; Take 2 tablets (1,000 mg total) by mouth 2 (two) times daily with meals.  Dispense: 360 tablet; Refill: 0  5. Gallbladder sludge  -     SURGERY - INTERNAL    Assessment & Plan  Type 2 diabetes mellitus without complications  A1c is 7.9% on last  OV  . Non-adherence to Januvia due to cost and inconsistent metformin use due to GI side effects. Morning glucose ~130 mg/dL. Discussed alternative medications considering cost and insurance.  - Restart metformin  mg twice daily. Increase to 1000 mg twice daily if tolerated.  - Consider Januvia or Ozempic if metformin not tolerated.  - Follow up in 3 months to assess diabetes management.    Hypercholesterolemia  On rosuvastatin 20 mg every other day due to liver enzyme concerns. LDL goal <70 mg/dL. Need to assess liver function for potential statin dose increase.  - Continue rosuvastatin 20 mg every other day.  - Order liver enzymes and lipid profile.  - Follow up with GI to assess liver function and statin dose safety.    Elevated liver enzymes  Elevated since statin initiation. Differential includes statin-induced injury, gallbladder issues, or fatty liver. Further evaluation needed.  - Order liver enzyme tests.  - Refer to GI  for further rec    Fatty liver  Ultrasound shows fatty liver. Need to assess contribution to elevated liver enzymes.  - Monitor liver function tests.  - Discuss with GI during follow-up.    Gallbladder sludge  Ultrasound shows sludge without stones.  no symptoms. Discussed potential complications and need for evaluation.  - Refer to surgery for gallbladder evaluation and potential cholecystectomy.  -  May be causing elevated liver Enz    Gastroesophageal reflux disease  (GERD)  Experiences reflux, especially post large meals or tomato-based foods. Managed with omeprazole.  - Continue omeprazole 40 mg daily.      No follow-ups on file.      Jeaneth Queen MD, 5/6/2025, 12:29 PM              [1]   Current Outpatient Medications   Medication Sig Dispense Refill    metFORMIN  MG Oral Tablet 24 Hr Take 2 tablets (1,000 mg total) by mouth 2 (two) times daily with meals. 360 tablet 0    rosuvastatin 20 MG Oral Tab Take 1 tablet (20 mg total) by mouth nightly. 90 tablet 3    Omeprazole 40 MG Oral Capsule Delayed Release TAKE 1 CAPSULE BY MOUTH BEFORE BREAKFAST. 30 capsule 0    Blood Glucose Monitoring Suppl (ONETOUCH VERIO) w/Device Does not apply Kit 1 Units daily. Check blood sugars once a day 1 kit 0    Glucose Blood (ONETOUCH VERIO) In Vitro Strip Check blood sugars once a day 300 strip 1    OneTouch Delica Lancets 33G Does not apply Misc 1 Lancet by Finger stick route daily. 300 each 1    Multiple Vitamins-Minerals (MULTIVITAMIN ADULT, MINERALS,) Oral Tab       Glucose Blood (ONETOUCH VERIO) In Vitro Strip Use as directed. 100 strip 1

## 2025-05-06 NOTE — PROGRESS NOTES
The following individual(s) verbally consented to be recorded using ambient AI listening technology and understand that they can each withdraw their consent to this listening technology at any point by asking the clinician to turn off or pause the recording:    Patient name: Andressa Molina  Additional names:

## 2025-05-06 NOTE — PATIENT INSTRUCTIONS
Thank you for choosing [facility]  To Do:  FOR FREDDIE DODD    Have blood tests done  Follow up with surgery regarding Gall bladder  Follow up with GI regarding elevated liver enzymes Dr Garcia  Do a trial of restarting Metformin, aim to take 1000 mg twice a day, let us know of any medication problems or concerns.  Follow up in 3 months for recheck

## 2025-05-12 ENCOUNTER — LAB ENCOUNTER (OUTPATIENT)
Dept: LAB | Age: 45
End: 2025-05-12
Attending: EMERGENCY MEDICINE
Payer: COMMERCIAL

## 2025-05-12 DIAGNOSIS — E78.01 FAMILIAL HYPERCHOLESTEROLEMIA: ICD-10-CM

## 2025-05-12 DIAGNOSIS — E78.00 HYPERCHOLESTEROLEMIA: ICD-10-CM

## 2025-05-12 LAB
ALBUMIN SERPL-MCNC: 4.5 G/DL (ref 3.2–4.8)
ALBUMIN/GLOB SERPL: 1.6 {RATIO} (ref 1–2)
ALP LIVER SERPL-CCNC: 83 U/L (ref 37–98)
ALT SERPL-CCNC: 52 U/L (ref 10–49)
ANION GAP SERPL CALC-SCNC: 10 MMOL/L (ref 0–18)
AST SERPL-CCNC: 30 U/L (ref ?–34)
BILIRUB SERPL-MCNC: 0.5 MG/DL (ref 0.3–1.2)
BUN BLD-MCNC: 9 MG/DL (ref 9–23)
CALCIUM BLD-MCNC: 9.4 MG/DL (ref 8.7–10.6)
CHLORIDE SERPL-SCNC: 107 MMOL/L (ref 98–112)
CHOLEST SERPL-MCNC: 211 MG/DL (ref ?–200)
CO2 SERPL-SCNC: 22 MMOL/L (ref 21–32)
CREAT BLD-MCNC: 0.84 MG/DL (ref 0.55–1.02)
EGFRCR SERPLBLD CKD-EPI 2021: 87 ML/MIN/1.73M2 (ref 60–?)
FASTING PATIENT LIPID ANSWER: YES
FASTING STATUS PATIENT QL REPORTED: YES
GLOBULIN PLAS-MCNC: 2.8 G/DL (ref 2–3.5)
GLUCOSE BLD-MCNC: 121 MG/DL (ref 70–99)
HDLC SERPL-MCNC: 61 MG/DL (ref 40–59)
LDLC SERPL CALC-MCNC: 135 MG/DL (ref ?–100)
NONHDLC SERPL-MCNC: 150 MG/DL (ref ?–130)
OSMOLALITY SERPL CALC.SUM OF ELEC: 288 MOSM/KG (ref 275–295)
POTASSIUM SERPL-SCNC: 4.4 MMOL/L (ref 3.5–5.1)
PROT SERPL-MCNC: 7.3 G/DL (ref 5.7–8.2)
SODIUM SERPL-SCNC: 139 MMOL/L (ref 136–145)
TRIGL SERPL-MCNC: 87 MG/DL (ref 30–149)
VLDLC SERPL CALC-MCNC: 16 MG/DL (ref 0–30)

## 2025-05-12 PROCEDURE — 80053 COMPREHEN METABOLIC PANEL: CPT

## 2025-05-12 PROCEDURE — 80061 LIPID PANEL: CPT

## 2025-05-12 PROCEDURE — 36415 COLL VENOUS BLD VENIPUNCTURE: CPT

## 2025-05-15 ENCOUNTER — APPOINTMENT (OUTPATIENT)
Dept: CV DIAGNOSTICS | Age: 45
End: 2025-05-15
Attending: STUDENT IN AN ORGANIZED HEALTH CARE EDUCATION/TRAINING PROGRAM
Payer: COMMERCIAL

## 2025-05-15 ENCOUNTER — APPOINTMENT (OUTPATIENT)
Dept: GENERAL RADIOLOGY | Age: 45
End: 2025-05-15
Attending: STUDENT IN AN ORGANIZED HEALTH CARE EDUCATION/TRAINING PROGRAM
Payer: COMMERCIAL

## 2025-05-15 ENCOUNTER — HOSPITAL ENCOUNTER (EMERGENCY)
Age: 45
Discharge: HOME | End: 2025-05-15
Attending: STUDENT IN AN ORGANIZED HEALTH CARE EDUCATION/TRAINING PROGRAM
Payer: COMMERCIAL

## 2025-05-15 VITALS
HEART RATE: 101 BPM | HEIGHT: 62 IN | DIASTOLIC BLOOD PRESSURE: 99 MMHG | OXYGEN SATURATION: 96 % | WEIGHT: 142 LBS | BODY MASS INDEX: 26.13 KG/M2 | SYSTOLIC BLOOD PRESSURE: 137 MMHG | RESPIRATION RATE: 18 BRPM

## 2025-05-15 DIAGNOSIS — I10 ELEVATED BLOOD PRESSURE READING IN OFFICE WITH DIAGNOSIS OF HYPERTENSION: Primary | ICD-10-CM

## 2025-05-15 DIAGNOSIS — R07.89 CHEST PAIN, ATYPICAL: ICD-10-CM

## 2025-05-15 DIAGNOSIS — F41.9 ANXIETY: ICD-10-CM

## 2025-05-15 DIAGNOSIS — R00.0 TACHYCARDIA: ICD-10-CM

## 2025-05-15 LAB
ALBUMIN SERPL-MCNC: 5 G/DL (ref 3.2–4.8)
ALBUMIN/GLOB SERPL: 1.7 {RATIO} (ref 1–2)
ALP LIVER SERPL-CCNC: 86 U/L (ref 37–98)
ALT SERPL-CCNC: 59 U/L (ref 10–49)
ANION GAP SERPL CALC-SCNC: 9 MMOL/L (ref 0–18)
AST SERPL-CCNC: 34 U/L (ref ?–34)
ATRIAL RATE: 104 BPM
BASOPHILS # BLD AUTO: 0.06 X10(3) UL (ref 0–0.2)
BASOPHILS NFR BLD AUTO: 0.8 %
BILIRUB SERPL-MCNC: 0.6 MG/DL (ref 0.3–1.2)
BUN BLD-MCNC: 8 MG/DL (ref 9–23)
CALCIUM BLD-MCNC: 9.3 MG/DL (ref 8.7–10.6)
CHLORIDE SERPL-SCNC: 102 MMOL/L (ref 98–112)
CO2 SERPL-SCNC: 24 MMOL/L (ref 21–32)
CREAT BLD-MCNC: 0.95 MG/DL (ref 0.55–1.02)
D DIMER PPP FEU-MCNC: 0.27 UG/ML FEU (ref ?–0.5)
EGFRCR SERPLBLD CKD-EPI 2021: 75 ML/MIN/1.73M2 (ref 60–?)
EOSINOPHIL # BLD AUTO: 0.12 X10(3) UL (ref 0–0.7)
EOSINOPHIL NFR BLD AUTO: 1.6 %
ERYTHROCYTE [DISTWIDTH] IN BLOOD BY AUTOMATED COUNT: 12.4 %
GLOBULIN PLAS-MCNC: 3 G/DL (ref 2–3.5)
GLUCOSE BLD-MCNC: 238 MG/DL (ref 70–99)
HCT VFR BLD AUTO: 43 % (ref 35–48)
HGB BLD-MCNC: 14.7 G/DL (ref 12–16)
IMM GRANULOCYTES # BLD AUTO: 0.02 X10(3) UL (ref 0–1)
IMM GRANULOCYTES NFR BLD: 0.3 %
LYMPHOCYTES # BLD AUTO: 2.17 X10(3) UL (ref 1–4)
LYMPHOCYTES NFR BLD AUTO: 29.6 %
MCH RBC QN AUTO: 30.5 PG (ref 26–34)
MCHC RBC AUTO-ENTMCNC: 34.2 G/DL (ref 31–37)
MCV RBC AUTO: 89.2 FL (ref 80–100)
MONOCYTES # BLD AUTO: 0.61 X10(3) UL (ref 0.1–1)
MONOCYTES NFR BLD AUTO: 8.3 %
NEUTROPHILS # BLD AUTO: 4.36 X10 (3) UL (ref 1.5–7.7)
NEUTROPHILS # BLD AUTO: 4.36 X10(3) UL (ref 1.5–7.7)
NEUTROPHILS NFR BLD AUTO: 59.4 %
OSMOLALITY SERPL CALC.SUM OF ELEC: 286 MOSM/KG (ref 275–295)
P AXIS: 58 DEGREES
P-R INTERVAL: 184 MS
PLATELET # BLD AUTO: 333 10(3)UL (ref 150–450)
POTASSIUM SERPL-SCNC: 3.5 MMOL/L (ref 3.5–5.1)
PROT SERPL-MCNC: 8 G/DL (ref 5.7–8.2)
Q-T INTERVAL: 364 MS
QRS DURATION: 80 MS
QTC CALCULATION (BEZET): 478 MS
R AXIS: 18 DEGREES
RBC # BLD AUTO: 4.82 X10(6)UL (ref 3.8–5.3)
SODIUM SERPL-SCNC: 135 MMOL/L (ref 136–145)
T AXIS: 44 DEGREES
TROPONIN I SERPL HS-MCNC: <3 NG/L (ref ?–34)
TSI SER-ACNC: 0.9 UIU/ML (ref 0.55–4.78)
VENTRICULAR RATE: 104 BPM
WBC # BLD AUTO: 7.3 X10(3) UL (ref 4–11)

## 2025-05-15 PROCEDURE — 99285 EMERGENCY DEPT VISIT HI MDM: CPT

## 2025-05-15 PROCEDURE — 93350 STRESS TTE ONLY: CPT | Performed by: STUDENT IN AN ORGANIZED HEALTH CARE EDUCATION/TRAINING PROGRAM

## 2025-05-15 PROCEDURE — 93017 CV STRESS TEST TRACING ONLY: CPT | Performed by: STUDENT IN AN ORGANIZED HEALTH CARE EDUCATION/TRAINING PROGRAM

## 2025-05-15 PROCEDURE — 85379 FIBRIN DEGRADATION QUANT: CPT | Performed by: STUDENT IN AN ORGANIZED HEALTH CARE EDUCATION/TRAINING PROGRAM

## 2025-05-15 PROCEDURE — 85025 COMPLETE CBC W/AUTO DIFF WBC: CPT | Performed by: STUDENT IN AN ORGANIZED HEALTH CARE EDUCATION/TRAINING PROGRAM

## 2025-05-15 PROCEDURE — 93018 CV STRESS TEST I&R ONLY: CPT | Performed by: STUDENT IN AN ORGANIZED HEALTH CARE EDUCATION/TRAINING PROGRAM

## 2025-05-15 PROCEDURE — 36415 COLL VENOUS BLD VENIPUNCTURE: CPT

## 2025-05-15 PROCEDURE — 80053 COMPREHEN METABOLIC PANEL: CPT | Performed by: STUDENT IN AN ORGANIZED HEALTH CARE EDUCATION/TRAINING PROGRAM

## 2025-05-15 PROCEDURE — 84443 ASSAY THYROID STIM HORMONE: CPT | Performed by: STUDENT IN AN ORGANIZED HEALTH CARE EDUCATION/TRAINING PROGRAM

## 2025-05-15 PROCEDURE — 93010 ELECTROCARDIOGRAM REPORT: CPT

## 2025-05-15 PROCEDURE — 71045 X-RAY EXAM CHEST 1 VIEW: CPT | Performed by: STUDENT IN AN ORGANIZED HEALTH CARE EDUCATION/TRAINING PROGRAM

## 2025-05-15 PROCEDURE — 84484 ASSAY OF TROPONIN QUANT: CPT | Performed by: STUDENT IN AN ORGANIZED HEALTH CARE EDUCATION/TRAINING PROGRAM

## 2025-05-15 PROCEDURE — 93005 ELECTROCARDIOGRAM TRACING: CPT

## 2025-05-15 RX ORDER — LABETALOL HYDROCHLORIDE 5 MG/ML
10 INJECTION, SOLUTION INTRAVENOUS ONCE
Status: DISCONTINUED | OUTPATIENT
Start: 2025-05-15 | End: 2025-05-15

## 2025-05-15 RX ORDER — NITROGLYCERIN 0.4 MG/1
0.4 TABLET SUBLINGUAL ONCE
Status: COMPLETED | OUTPATIENT
Start: 2025-05-15 | End: 2025-05-15

## 2025-05-15 RX ORDER — ASPIRIN 325 MG
325 TABLET ORAL ONCE
Status: COMPLETED | OUTPATIENT
Start: 2025-05-15 | End: 2025-05-15

## 2025-05-15 RX ORDER — LORAZEPAM 1 MG/1
1 TABLET ORAL ONCE
Status: COMPLETED | OUTPATIENT
Start: 2025-05-15 | End: 2025-05-15

## 2025-05-15 NOTE — ED PROVIDER NOTES
Patient Seen in: [facility] [place]      History     Chief Complaint   Patient presents with    Chest Pain Angina    Hypertension     Stated Complaint: high bp, chest heaviness    Subjective:   HPI  History of Present Illness            Pt developed chest heaviness around 8:30/9am.  She checked her blood pressure and notes it was elevated in 200s.  She does not usually check her blood pressure often.  On arrival patient is tachycardic and systolic is 190.  She denies any history of smoking, she has no shortness of breath or pleuritic nature to her symptoms.      Objective:     Past Medical History:    Allergic rhinitis    Esophageal reflux    Heartburn    High cholesterol    Hyperlipidemia    Indigestion    Lateral epicondylitis, right elbow    Problems with swallowing    Thyroid cyst    had benign biopsy    Uncomfortable fullness after meals              Past Surgical History:   Procedure Laterality Date    Appendectomy  02/03/23                Social History     Socioeconomic History    Marital status:    Tobacco Use    Smoking status: Never     Passive exposure: Never    Smokeless tobacco: Never   Vaping Use    Vaping status: Never Used   Substance and Sexual Activity    Alcohol use: No    Drug use: No    Sexual activity: Yes     Partners: Male   Other Topics Concern    Caffeine Concern No    Exercise No    Seat Belt No    Special Diet No    Stress Concern No    Weight Concern No     Social Drivers of Health     Food Insecurity: No Food Insecurity (5/6/2025)    NCSS - Food Insecurity     Worried About Running Out of Food in the Last Year: No     Ran Out of Food in the Last Year: No   Transportation Needs: No Transportation Needs (5/6/2025)    NCSS - Transportation     Lack of Transportation: No   Housing Stability: Not At Risk (5/6/2025)    NCSS - Housing/Utilities     Has Housing: Yes     Worried About Losing Housing: No     Unable to Get Utilities: No                                 Physical Exam     ED Triage Vitals [05/15/25 0944]   BP (!) 193/102   Pulse 108   Resp 18   Temp    Temp src    SpO2 99 %   O2 Device None (Room air)       Current Vitals:   Vital Signs  BP: (!) 137/99 (Simultaneous filing. User may not have seen previous data.)  Pulse: 101 (Simultaneous filing. User may not have seen previous data.)  Resp: 18 (Simultaneous filing. User may not have seen previous data.)    Oxygen Therapy  SpO2: 96 % (Simultaneous filing. User may not have seen previous data.)  O2 Device: None (Room air) (Simultaneous filing. User may not have seen previous data.)          Physical Exam  Vitals and nursing note reviewed.   Constitutional:       General: She is not in acute distress.     Appearance: Normal appearance.   HENT:      Head: Normocephalic.      Nose: Nose normal.      Mouth/Throat:      Mouth: Mucous membranes are moist.   Eyes:      Extraocular Movements: Extraocular movements intact.      Pupils: Pupils are equal, round, and reactive to light.   Cardiovascular:      Rate and Rhythm: Normal rate and regular rhythm.      Pulses: Normal pulses.      Heart sounds: Normal heart sounds.   Pulmonary:      Effort: Pulmonary effort is normal.   Chest:      Chest wall: No mass, deformity, tenderness or crepitus.   Abdominal:      General: Abdomen is flat. Bowel sounds are normal. There is no distension.      Palpations: Abdomen is soft.      Tenderness: There is no abdominal tenderness. There is no right CVA tenderness, left CVA tenderness, guarding or rebound.      Hernia: No hernia is present.   Musculoskeletal:         General: No swelling or tenderness. Normal range of motion.      Cervical back: Normal range of motion.   Skin:     General: Skin is warm and dry.   Neurological:      Mental Status: She is alert and oriented to person, place, and time. Mental status is at baseline.   Psychiatric:         Mood and Affect: Mood normal.                   ED Course     Labs  Reviewed   COMP METABOLIC PANEL (14) - Abnormal; Notable for the following components:       Result Value    Glucose 238 (*)     Sodium 135 (*)     BUN 8 (*)     AST 34 (*)     ALT 59 (*)     Albumin 5.0 (*)     All other components within normal limits   TROPONIN I HIGH SENSITIVITY - Normal   D-DIMER - Normal   TSH W REFLEX TO FREE T4 - Normal   CBC WITH DIFFERENTIAL WITH PLATELET   RAINBOW DRAW LAVENDER   RAINBOW DRAW LIGHT GREEN   RAINBOW DRAW BLUE     EKG    Rate, intervals and axes as noted on EKG Report.  Rate: 104  Rhythm: Sinus Rhythm  Reading: Nonspecific ST changes              Results        XR CHEST AP PORTABLE  (CPT=71045)  Result Date: 5/15/2025  PROCEDURE:  XR CHEST AP PORTABLE  (CPT=71045)  TECHNIQUE:  AP chest radiograph was obtained.  COMPARISON:  None.  INDICATIONS:  high bp, chest heaviness  PATIENT STATED HISTORY: (As transcribed by Technologist)  Pt c/o chest heaviness and hypertension today.   FINDINGS: Cardiac silhouette and pulmonary vasculature are unremarkable. No consolidation, pleural effusion or pneumothorax. IMPRESSION: Unremarkable portable chest radiograph.   LOCATION:  [facility]      Dictated by (CST): Jamel Echeverria MD on 5/15/2025 at 10:54 AM     Finalized by (CST): Jamel Echeverria MD on 5/15/2025 at 10:55 AM                            MDM              Medical Decision Making  The differential includes the following  Acute MI which is a life threat, PE also lactic, hypertensive emergency, anxiety    Pertinent comorbidities include  As detailed above    Pertinent social history includes  As detailed above    Labs  Troponin negative  D-dimer negative    Imaging studies  I reviewed the images and my independent interpreation after review is no evidence of pneumonia. Additionaly, I reviewd the radiology report that states the following as detailed above    External data reviewed    Discussion of management with external providers  Dr. Chiu with cardiology who reviewed the  patient's stress echo and notes that it is within normal range.    ER course  Patient's initial blood pressure elevated to 190 and gradually downward trended.  She was given 1 sublingual nitroglycerin.  She is also given Ativan as she appeared visibly anxious.  Initial troponin negative dimer negative she did undergo stress test which is normal.  Patient's been encouraged to follow-up with PCP as she no longer has symptoms and discuss the initiation of antihypertensives.    Disposition and Plan     Clinical Impression:  1. Elevated blood pressure reading in office with diagnosis of hypertension    2. Tachycardia    3. Anxiety    4. Chest pain, atypical         Disposition:  Discharge  5/15/2025  1:32 pm    Follow-up:  No follow-up provider specified.        Medications Prescribed:  Discharge Medication List as of 5/15/2025  1:34 PM                Supplementary Documentation:

## 2025-05-15 NOTE — DISCHARGE INSTRUCTIONS
Your blood work today which looked at your heart appears normal.  Your ultrasound of your heart has been reviewed by cardiology and also appears within normal limits.  Your heart rate has remained elevated which could be due to anxiety.  I have ordered a blood test to look at your thyroid hormone however that test will not result today.  It is very important for you to review this test in the next few days on MyChart and follow-up with your primary care physician.  At times elevated thyroid hormone levels can lead to elevated blood pressure readings and heart rates.    At this time however I feel you can be discharged home.  Please call your primary care physician to also discuss starting blood pressure lowering medications that can also control your heart rate.  If you have any worsening symptoms please return to the ER immediately.

## 2025-05-16 ENCOUNTER — TELEMEDICINE (OUTPATIENT)
Dept: FAMILY MEDICINE CLINIC | Facility: CLINIC | Age: 45
End: 2025-05-16
Payer: COMMERCIAL

## 2025-05-16 DIAGNOSIS — I15.2 HYPERTENSION ASSOCIATED WITH DIABETES (HCC): Primary | ICD-10-CM

## 2025-05-16 DIAGNOSIS — E11.59 HYPERTENSION ASSOCIATED WITH DIABETES (HCC): Primary | ICD-10-CM

## 2025-05-16 PROCEDURE — 98005 SYNCH AUDIO-VIDEO EST LOW 20: CPT | Performed by: EMERGENCY MEDICINE

## 2025-05-16 RX ORDER — LISINOPRIL 10 MG/1
10 TABLET ORAL DAILY
Qty: 90 TABLET | Refills: 0 | Status: SHIPPED | OUTPATIENT
Start: 2025-05-16

## 2025-05-16 NOTE — PROGRESS NOTES
This is a telemedicine visit with live, interactive video and audio.     Andressa Molina verbally consents to a Virtual/Telephone Check-In visit on 25.    Patient understands and accepts financial responsibility for any deductible, co-insurance and/or co-pays associated with this service.    Duration of the service: 20 minutes    SUBJECTIVE      Seen in ER yesterday.  [place] dizzy and off . BP noted to be high. Seen in ER where BP noted to be 200 + systolic  Work up in ER all negative  Stress test also negative    Today BP better 138/83  Feels overall better  No CP np SOB  No dizziness no palpitations    HISTORY:  Past Medical History[1]   Past Surgical History[2]   Family History[3]   Short Social Hx on File[4]     Allergies[5]   Current Medications[6]    OBJECTIVE  Physical Exam:   alert, appears stated age, and cooperative, Speaking in full sentences comfortably, Normal work of breathing, and Skin color, texture, turgor normal. No rashes or lesions      ASSESSMENT & PLAN  Diagnoses and all orders for this visit:    Hypertension associated with diabetes (HCC)    Other orders  -     lisinopril 10 MG Oral Tab; Take 1 tablet (10 mg total) by mouth daily.      Will request records from ER visit  Pt reports conotinued elevated BP  Will start patient on lisinopril  Discussed BP goal  Discussed potential SE including angioedema, or ace induced cough  Regular BP checks follow up in 2-3 months for recheck    Jeaneth Queen MD               [1]   Past Medical History:   Allergic rhinitis    Esophageal reflux    Heartburn    High cholesterol    Hyperlipidemia    Indigestion    Lateral epicondylitis, right elbow    Problems with swallowing    Thyroid cyst    had benign biopsy    Uncomfortable fullness after meals   [2]   Past Surgical History:  Procedure Laterality Date    Appendectomy  23   [3]   Family History  Problem Relation Age of Onset    Diabetes Father             Hypertension Father      Diabetes Sister     Diabetes Brother     Diabetes Brother     Diabetes Brother    [4]   Social History  Socioeconomic History    Marital status:    Tobacco Use    Smoking status: Never     Passive exposure: Never    Smokeless tobacco: Never   Vaping Use    Vaping status: Never Used   Substance and Sexual Activity    Alcohol use: No    Drug use: No    Sexual activity: Yes     Partners: Male   Other Topics Concern    Caffeine Concern No    Exercise No    Seat Belt No    Special Diet No    Stress Concern No    Weight Concern No     Social Drivers of Health     Food Insecurity: No Food Insecurity (5/6/2025)    NCSS - Food Insecurity     Worried About Running Out of Food in the Last Year: No     Ran Out of Food in the Last Year: No   Transportation Needs: No Transportation Needs (5/6/2025)    NCSS - Transportation     Lack of Transportation: No   Housing Stability: Not At Risk (5/6/2025)    NCSS - Housing/Utilities     Has Housing: Yes     Worried About Losing Housing: No     Unable to Get Utilities: No   [5] No Known Allergies  [6]   Current Outpatient Medications   Medication Sig Dispense Refill    lisinopril 10 MG Oral Tab Take 1 tablet (10 mg total) by mouth daily. 90 tablet 0    ezetimibe (ZETIA) 10 MG Oral Tab Take 1 tablet (10 mg total) by mouth daily. 90 tablet 1    metFORMIN  MG Oral Tablet 24 Hr Take 2 tablets (1,000 mg total) by mouth 2 (two) times daily with meals. 360 tablet 0    rosuvastatin 20 MG Oral Tab Take 1 tablet (20 mg total) by mouth nightly. 90 tablet 3    Omeprazole 40 MG Oral Capsule Delayed Release TAKE 1 CAPSULE BY MOUTH BEFORE BREAKFAST. 30 capsule 0    Blood Glucose Monitoring Suppl (ONETOUCH VERIO) w/Device Does not apply Kit 1 Units daily. Check blood sugars once a day 1 kit 0    Glucose Blood (ONETOUCH VERIO) In Vitro Strip Check blood sugars once a day 300 strip 1    OneTouch Delica Lancets 33G Does not apply Misc 1 Lancet by Finger stick route daily. 300 each 1    Multiple  Vitamins-Minerals (MULTIVITAMIN ADULT, MINERALS,) Oral Tab

## 2025-06-24 ENCOUNTER — OFFICE VISIT (OUTPATIENT)
Facility: LOCATION | Age: 45
End: 2025-06-24
Payer: COMMERCIAL

## 2025-06-24 VITALS
SYSTOLIC BLOOD PRESSURE: 146 MMHG | DIASTOLIC BLOOD PRESSURE: 97 MMHG | TEMPERATURE: 97 F | OXYGEN SATURATION: 97 % | HEART RATE: 89 BPM

## 2025-06-24 DIAGNOSIS — K82.8 GALLBLADDER SLUDGE: Primary | ICD-10-CM

## 2025-06-24 PROCEDURE — 99214 OFFICE O/P EST MOD 30 MIN: CPT | Performed by: SURGERY

## 2025-06-24 NOTE — H&P
The following individual(s) verbally consented to be recorded using ambient AI listening technology and understand that they can each withdraw their consent to this listening technology at any point by asking the clinician to turn off or pause the recording:    Patient name: Andressa Molina  Additional names:  none      New Patient Visit Note       Active Problems      1. Gallbladder sludge        Chief Complaint   Chief Complaint   Patient presents with    New Patient     NP - Gallbladder sludge-US ABDOMEN COMPLETE 3/4/25 - never had any pain. Just worried what she should do.         History of Present Illness     History of Present Illness  Andressa Molina is a 44 year old female who presents for surgical consultation regarding gallbladder sludge. She was referred by her primary doctor for evaluation of gallbladder sludge.    Gallbladder sludge and hepatic findings  - Referred for evaluation of gallbladder sludge after elevated liver enzymes were detected  - Abdominal ultrasound revealed gallbladder sludge and mild hepatic steatosis  - No abdominal pain, no pain after eating fatty or greasy foods, and no right upper quadrant pain radiating to the back or shoulder    Diabetes mellitus and medication effects  - Diabetes managed with metformin  - Metformin use associated with bloating    Hyperlipidemia and statin use  - Currently taking rosuvastatin  - Has not discontinued rosuvastatin        Allergies  Andressa has no known allergies.    Past Medical / Surgical / Social / Family History    The past medical and past surgical history have been reviewed by me today.    Past Medical History[1]  Past Surgical History[2]    The family history and social history have been reviewed by me today.    Family History[3]  Social Hx on file[4]   Medications - Current[5]      Review of Systems  The Review of Systems has been reviewed by me during today.  Review of Systems   Constitutional: Negative.    HENT: Negative.      Eyes: Negative.    Respiratory: Negative.     Cardiovascular: Negative.    Gastrointestinal: Negative.    Genitourinary: Negative.    Musculoskeletal: Negative.    Skin: Negative.    Neurological: Negative.    Psychiatric/Behavioral: Negative.         Physical Findings   BP (!) 146/97 (BP Location: Left arm, Patient Position: Sitting, Cuff Size: adult)   Pulse 89   Temp 97.3 °F (36.3 °C) (Temporal)   LMP 04/26/2025 (Exact Date)   SpO2 97%   Physical Exam  Vitals and nursing note reviewed.   Constitutional:       General: She is not in acute distress.     Appearance: Normal appearance. She is well-developed.   HENT:      Head: Normocephalic and atraumatic.   Eyes:      General: No scleral icterus.     Conjunctiva/sclera: Conjunctivae normal.   Neck:      Trachea: No tracheal deviation.   Cardiovascular:      Rate and Rhythm: Normal rate and regular rhythm.      Heart sounds: S1 normal and S2 normal. No murmur heard.  Pulmonary:      Effort: No accessory muscle usage or respiratory distress.      Breath sounds: No decreased breath sounds, wheezing, rhonchi or rales.   Abdominal:      General: There is no distension or abdominal bruit.      Palpations: Abdomen is soft. Abdomen is not rigid. There is no shifting dullness, fluid wave, hepatomegaly, splenomegaly, mass or pulsatile mass.      Tenderness: There is no abdominal tenderness. There is no guarding or rebound. Negative signs include Gomez's sign and McBurney's sign.      Hernia: There is no hernia in the umbilical area or ventral area.   Skin:     General: Skin is warm and dry.   Neurological:      Mental Status: She is alert and oriented to person, place, and time.   Psychiatric:         Speech: Speech normal.         Behavior: Behavior normal.         Thought Content: Thought content normal.         Judgment: Judgment normal.             Assessment   1. Gallbladder sludge          Plan     Assessment & Plan  Elevated liver enzymes  Elevated liver enzymes  likely related to rosuvastatin. Ultrasound showed fatty liver and gallbladder sludge without gallbladder disease symptoms. Gallbladder sludge does not require surgery. Discussed gallstone prevalence and symptom monitoring.  - Follow up with primary care for liver enzyme monitoring.  - Consult gastroenterologist for liver enzyme and fatty liver evaluation.    Fatty liver  Fatty liver on ultrasound, likely due to type 2 diabetes and medication. No surgical symptoms. Explained fatty liver from hyperglycemia and hepatic steatosis.  - Consult gastroenterologist for fatty liver management.  - Adopt diet with controlled carbohydrates, increased protein, and healthy fats.    Type 2 diabetes mellitus  Type 2 diabetes may cause fatty liver via hyperglycemia and hepatic steatosis.  - Adopt carbohydrate-controlled diet with emphasis on protein and healthy fats.         No orders of the defined types were placed in this encounter.      Imaging & Referrals   None    Follow Up  No follow-ups on file.    Bird Kramer MD         [1]   Past Medical History:   Allergic rhinitis    Esophageal reflux    Heartburn    High cholesterol    Hyperlipidemia    Indigestion    Lateral epicondylitis, right elbow    Problems with swallowing    Thyroid cyst    had benign biopsy    Uncomfortable fullness after meals   [2]   Past Surgical History:  Procedure Laterality Date    Appendectomy  23   [3]   Family History  Problem Relation Age of Onset    Diabetes Father             Hypertension Father     Diabetes Sister     Diabetes Brother     Diabetes Brother     Diabetes Brother    [4]   Social History  Socioeconomic History    Marital status:    Tobacco Use    Smoking status: Never     Passive exposure: Never    Smokeless tobacco: Never   Vaping Use    Vaping status: Never Used   Substance and Sexual Activity    Alcohol use: No    Drug use: No    Sexual activity: Yes     Partners: Male   Other Topics Concern    Caffeine  Concern No    Exercise No    Seat Belt No    Special Diet No    Stress Concern No    Weight Concern No   [5]   Current Outpatient Medications:     lisinopril 10 MG Oral Tab, Take 1 tablet (10 mg total) by mouth daily., Disp: 90 tablet, Rfl: 0    ezetimibe (ZETIA) 10 MG Oral Tab, Take 1 tablet (10 mg total) by mouth daily., Disp: 90 tablet, Rfl: 1    metFORMIN  MG Oral Tablet 24 Hr, Take 2 tablets (1,000 mg total) by mouth 2 (two) times daily with meals., Disp: 360 tablet, Rfl: 0    rosuvastatin 20 MG Oral Tab, Take 1 tablet (20 mg total) by mouth nightly., Disp: 90 tablet, Rfl: 3    Omeprazole 40 MG Oral Capsule Delayed Release, TAKE 1 CAPSULE BY MOUTH BEFORE BREAKFAST., Disp: 30 capsule, Rfl: 0    Blood Glucose Monitoring Suppl (ONETOUCH VERIO) w/Device Does not apply Kit, 1 Units daily. Check blood sugars once a day, Disp: 1 kit, Rfl: 0    Glucose Blood (ONETOUCH VERIO) In Vitro Strip, Check blood sugars once a day, Disp: 300 strip, Rfl: 1    OneTouch Delica Lancets 33G Does not apply Misc, 1 Lancet by Finger stick route daily., Disp: 300 each, Rfl: 1    Multiple Vitamins-Minerals (MULTIVITAMIN ADULT, MINERALS,) Oral Tab, , Disp: , Rfl:

## 2025-07-07 ENCOUNTER — OFFICE VISIT (OUTPATIENT)
Dept: FAMILY MEDICINE CLINIC | Facility: CLINIC | Age: 45
End: 2025-07-07
Payer: COMMERCIAL

## 2025-07-07 VITALS
RESPIRATION RATE: 18 BRPM | BODY MASS INDEX: 26 KG/M2 | TEMPERATURE: 98 F | OXYGEN SATURATION: 100 % | SYSTOLIC BLOOD PRESSURE: 136 MMHG | DIASTOLIC BLOOD PRESSURE: 90 MMHG | WEIGHT: 140.81 LBS | HEART RATE: 100 BPM

## 2025-07-07 DIAGNOSIS — R09.82 POST-NASAL DRIP: ICD-10-CM

## 2025-07-07 DIAGNOSIS — R05.8 COUGH PRESENT FOR GREATER THAN 3 WEEKS: Primary | ICD-10-CM

## 2025-07-07 DIAGNOSIS — R03.0 ELEVATED BLOOD PRESSURE READING: ICD-10-CM

## 2025-07-07 RX ORDER — BENZONATATE 200 MG/1
200 CAPSULE ORAL 3 TIMES DAILY PRN
Qty: 20 CAPSULE | Refills: 0 | Status: SHIPPED | OUTPATIENT
Start: 2025-07-07 | End: 2025-07-14

## 2025-07-07 NOTE — PATIENT INSTRUCTIONS
Zyrtec 10 mg during day, benadryl at night for post nasal drip for 1-2 weeks  Benzonatate three times daily as needed for cough  Cough drops as needed  Hydration, honey    If the above does not help with cough, contact PCP, possible cough due to lisinopril?

## 2025-07-07 NOTE — PROGRESS NOTES
CHIEF COMPLAINT:     Chief Complaint   Patient presents with    Cough     S/s for 3 weeks on/off, sore throat due to wet/dry cough. OTC meds taken.              HPI:   Andressa Molina is a 44 year old female who presents for cough, mostly dry for  3 weeks. Patient reports throat will feel itchy then pt will start coughing, have coughing fits, cough is frequent, day and night, interrupts sleep. Symptoms have been persistent since onset.  Treating symptoms with otc.  Pt started on lisinopril 5/16/25 @ 3 weeks before cough began, b/p at home is 120-130/80's  Pt denies GERD symptoms at present  Pt has h/o seasonal allergies, not taking zyrtec at present  Otherwise pt feels well, no cold symptoms, no sob, no wheeze, no fever, no fatigue    Current Medications[1]   Past Medical History[2]   Past Surgical History[3]      Short Social Hx on File[4]      REVIEW OF SYSTEMS:   GENERAL: intact appetite  SKIN: no rashes or abnormal skin lesions  HEENT: See HPI  LUNGS: See HPI  CARDIOVASCULAR: denies chest pain or palpitations   GI: denies N/V/C or abdominal pain      EXAM:   /90   Pulse 100   Temp 98.2 °F (36.8 °C) (Oral)   Resp 18   Wt 140 lb 12.8 oz (63.9 kg)   LMP 06/14/2025 (Exact Date)   SpO2 100%   BMI 25.75 kg/m²   GENERAL: well developed, well nourished,in no apparent distress  SKIN: no rashes  HEAD: atraumatic, normocephalic.  no tenderness on palpation of sinuses  EYES: conjunctiva clear, EOM intact  EARS: TM's grey, no bulging, no retraction, no fluid, bony landmarks visible  NOSE: Nostrils patent, clear nasal discharge, nasal mucosa pale   THROAT: Oral mucosa pink, moist. Posterior pharynx is not erythematous, + PND, no exudates. Tonsils WNL.    NECK: Supple, non-tender  LUNGS: clear to auscultation bilaterally, no wheezes or rhonchi. Breathing is non labored. + frequent dry cough  CARDIO: RRR without murmur  EXTREMITIES: no cyanosis, clubbing or edema  LYMPH:  no cervical lymphadenopathy.    PSYCH:  pleasant mood and affect  NEURO: no focal deficits    ASSESSMENT AND PLAN:   Andressa Molina is a 44 year old female who presents with upper respiratory symptoms that are consistent with    ASSESSMENT:   Encounter Diagnoses   Name Primary?    Elevated blood pressure reading     Cough present for greater than 3 weeks Yes    Post-nasal drip        PLAN:   Meds as below.    Comfort care as described in Patient Instructions  Zyrtec 10 mg during day, benadryl at night for post nasal drip for 1-2 weeks  Benzonatate three times daily as needed for cough  Cough drops as needed  Hydration, honey    If the above does not help with cough, contact PCP, possible cough due to lisinopril?    Meds & Refills for this Visit:  Requested Prescriptions     Signed Prescriptions Disp Refills    benzonatate 200 MG Oral Cap 20 capsule 0     Sig: Take 1 capsule (200 mg total) by mouth 3 (three) times daily as needed for cough.     Risks, benefits, and side effects of medication explained and discussed.  The patient indicates understanding of these issues and agrees to the plan.      Patient Instructions   Zyrtec 10 mg during day, benadryl at night for post nasal drip for 1-2 weeks  Benzonatate three times daily as needed for cough  Cough drops as needed  Hydration, honey    If the above does not help with cough, contact PCP, possible cough due to lisinopril?             [1]   Current Outpatient Medications   Medication Sig Dispense Refill    benzonatate 200 MG Oral Cap Take 1 capsule (200 mg total) by mouth 3 (three) times daily as needed for cough. 20 capsule 0    lisinopril 10 MG Oral Tab Take 1 tablet (10 mg total) by mouth daily. 90 tablet 0    ezetimibe (ZETIA) 10 MG Oral Tab Take 1 tablet (10 mg total) by mouth daily. 90 tablet 1    metFORMIN  MG Oral Tablet 24 Hr Take 2 tablets (1,000 mg total) by mouth 2 (two) times daily with meals. 360 tablet 0    rosuvastatin 20 MG Oral Tab Take 1 tablet (20 mg total) by mouth nightly. 90  tablet 3    Omeprazole 40 MG Oral Capsule Delayed Release TAKE 1 CAPSULE BY MOUTH BEFORE BREAKFAST. 30 capsule 0    Multiple Vitamins-Minerals (MULTIVITAMIN ADULT, MINERALS,) Oral Tab       Blood Glucose Monitoring Suppl (ONETOUCH VERIO) w/Device Does not apply Kit 1 Units daily. Check blood sugars once a day 1 kit 0    Glucose Blood (ONETOUCH VERIO) In Vitro Strip Check blood sugars once a day 300 strip 1    OneTouch Delica Lancets 33G Does not apply Misc 1 Lancet by Finger stick route daily. 300 each 1   [2]   Past Medical History:   Allergic rhinitis    Esophageal reflux    Heartburn    High cholesterol    Hyperlipidemia    Indigestion    Lateral epicondylitis, right elbow    Problems with swallowing    Thyroid cyst    had benign biopsy    Uncomfortable fullness after meals   [3]   Past Surgical History:  Procedure Laterality Date    Appendectomy  02/03/23   [4]   Social History  Socioeconomic History    Marital status:    Tobacco Use    Smoking status: Never     Passive exposure: Never    Smokeless tobacco: Never   Vaping Use    Vaping status: Never Used   Substance and Sexual Activity    Alcohol use: No    Drug use: No    Sexual activity: Yes     Partners: Male   Other Topics Concern    Caffeine Concern No    Exercise No    Seat Belt No    Special Diet No    Stress Concern No    Weight Concern No     Social Drivers of Health     Food Insecurity: No Food Insecurity (5/6/2025)    NCSS - Food Insecurity     Worried About Running Out of Food in the Last Year: No     Ran Out of Food in the Last Year: No   Transportation Needs: No Transportation Needs (5/6/2025)    NCSS - Transportation     Lack of Transportation: No   Housing Stability: Not At Risk (5/6/2025)    NCSS - Housing/Utilities     Has Housing: Yes     Worried About Losing Housing: No     Unable to Get Utilities: No

## 2025-07-09 ENCOUNTER — RESULTS FOLLOW-UP (OUTPATIENT)
Dept: FAMILY MEDICINE CLINIC | Facility: CLINIC | Age: 45
End: 2025-07-09

## 2025-07-22 ENCOUNTER — LAB ENCOUNTER (OUTPATIENT)
Dept: LAB | Age: 45
End: 2025-07-22
Attending: EMERGENCY MEDICINE
Payer: COMMERCIAL

## 2025-07-22 ENCOUNTER — OFFICE VISIT (OUTPATIENT)
Dept: FAMILY MEDICINE CLINIC | Facility: CLINIC | Age: 45
End: 2025-07-22
Payer: COMMERCIAL

## 2025-07-22 ENCOUNTER — TELEPHONE (OUTPATIENT)
Dept: FAMILY MEDICINE CLINIC | Facility: CLINIC | Age: 45
End: 2025-07-22

## 2025-07-22 VITALS
RESPIRATION RATE: 16 BRPM | DIASTOLIC BLOOD PRESSURE: 82 MMHG | WEIGHT: 140 LBS | SYSTOLIC BLOOD PRESSURE: 114 MMHG | BODY MASS INDEX: 25.76 KG/M2 | HEIGHT: 62 IN | HEART RATE: 88 BPM | OXYGEN SATURATION: 99 %

## 2025-07-22 DIAGNOSIS — E78.01 FAMILIAL HYPERCHOLESTEREMIA: ICD-10-CM

## 2025-07-22 DIAGNOSIS — I15.2 HYPERTENSION ASSOCIATED WITH DIABETES (HCC): ICD-10-CM

## 2025-07-22 DIAGNOSIS — R74.8 ELEVATED LIVER ENZYMES: ICD-10-CM

## 2025-07-22 DIAGNOSIS — E11.59 HYPERTENSION ASSOCIATED WITH DIABETES (HCC): ICD-10-CM

## 2025-07-22 DIAGNOSIS — E11.9 CONTROLLED TYPE 2 DIABETES MELLITUS WITHOUT COMPLICATION, WITHOUT LONG-TERM CURRENT USE OF INSULIN (HCC): ICD-10-CM

## 2025-07-22 DIAGNOSIS — Z12.31 ENCOUNTER FOR SCREENING MAMMOGRAM FOR BREAST CANCER: ICD-10-CM

## 2025-07-22 DIAGNOSIS — Z00.00 ENCOUNTER FOR ANNUAL PHYSICAL EXAMINATION EXCLUDING GYNECOLOGICAL EXAMINATION IN A PATIENT OLDER THAN 17 YEARS: Primary | ICD-10-CM

## 2025-07-22 DIAGNOSIS — Z00.00 LABORATORY EXAMINATION ORDERED AS PART OF A ROUTINE GENERAL MEDICAL EXAMINATION: ICD-10-CM

## 2025-07-22 DIAGNOSIS — Z12.11 SCREENING FOR COLON CANCER: ICD-10-CM

## 2025-07-22 LAB
ALBUMIN SERPL-MCNC: 4.7 G/DL (ref 3.2–4.8)
ALBUMIN/GLOB SERPL: 1.7 {RATIO} (ref 1–2)
ALP LIVER SERPL-CCNC: 81 U/L (ref 37–98)
ALT SERPL-CCNC: 36 U/L (ref 10–49)
ANION GAP SERPL CALC-SCNC: 8 MMOL/L (ref 0–18)
AST SERPL-CCNC: 25 U/L (ref ?–34)
BASOPHILS # BLD AUTO: 0.09 X10(3) UL (ref 0–0.2)
BASOPHILS NFR BLD AUTO: 1.4 %
BILIRUB SERPL-MCNC: 0.3 MG/DL (ref 0.3–1.2)
BUN BLD-MCNC: 7 MG/DL (ref 9–23)
CALCIUM BLD-MCNC: 9.3 MG/DL (ref 8.7–10.6)
CHLORIDE SERPL-SCNC: 102 MMOL/L (ref 98–112)
CHOLEST SERPL-MCNC: 245 MG/DL (ref ?–200)
CO2 SERPL-SCNC: 27 MMOL/L (ref 21–32)
CREAT BLD-MCNC: 0.79 MG/DL (ref 0.55–1.02)
EGFRCR SERPLBLD CKD-EPI 2021: 94 ML/MIN/1.73M2 (ref 60–?)
EOSINOPHIL # BLD AUTO: 0.1 X10(3) UL (ref 0–0.7)
EOSINOPHIL NFR BLD AUTO: 1.5 %
ERYTHROCYTE [DISTWIDTH] IN BLOOD BY AUTOMATED COUNT: 12.8 %
FASTING PATIENT LIPID ANSWER: YES
FASTING STATUS PATIENT QL REPORTED: YES
GLOBULIN PLAS-MCNC: 2.8 G/DL (ref 2–3.5)
GLUCOSE BLD-MCNC: 120 MG/DL (ref 70–99)
HCT VFR BLD AUTO: 41.9 % (ref 35–48)
HDLC SERPL-MCNC: 75 MG/DL (ref 40–59)
HEMOGLOBIN A1C: 7.1 % (ref 4.3–5.6)
HGB BLD-MCNC: 13.8 G/DL (ref 12–16)
IMM GRANULOCYTES # BLD AUTO: 0.01 X10(3) UL (ref 0–1)
IMM GRANULOCYTES NFR BLD: 0.2 %
LDLC SERPL CALC-MCNC: 142 MG/DL (ref ?–100)
LYMPHOCYTES # BLD AUTO: 1.99 X10(3) UL (ref 1–4)
LYMPHOCYTES NFR BLD AUTO: 30.2 %
MCH RBC QN AUTO: 30.1 PG (ref 26–34)
MCHC RBC AUTO-ENTMCNC: 32.9 G/DL (ref 31–37)
MCV RBC AUTO: 91.5 FL (ref 80–100)
MONOCYTES # BLD AUTO: 0.56 X10(3) UL (ref 0.1–1)
MONOCYTES NFR BLD AUTO: 8.5 %
NEUTROPHILS # BLD AUTO: 3.83 X10 (3) UL (ref 1.5–7.7)
NEUTROPHILS # BLD AUTO: 3.83 X10(3) UL (ref 1.5–7.7)
NEUTROPHILS NFR BLD AUTO: 58.2 %
NONHDLC SERPL-MCNC: 170 MG/DL (ref ?–130)
OSMOLALITY SERPL CALC.SUM OF ELEC: 283 MOSM/KG (ref 275–295)
PLATELET # BLD AUTO: 395 10(3)UL (ref 150–450)
POTASSIUM SERPL-SCNC: 4 MMOL/L (ref 3.5–5.1)
PROT SERPL-MCNC: 7.5 G/DL (ref 5.7–8.2)
RBC # BLD AUTO: 4.58 X10(6)UL (ref 3.8–5.3)
SODIUM SERPL-SCNC: 137 MMOL/L (ref 136–145)
T3FREE SERPL-MCNC: 2.89 PG/ML (ref 2.4–4.2)
T4 FREE SERPL-MCNC: 1.7 NG/DL (ref 0.8–1.7)
TRIGL SERPL-MCNC: 159 MG/DL (ref 30–149)
TSI SER-ACNC: 0.52 UIU/ML (ref 0.55–4.78)
VLDLC SERPL CALC-MCNC: 30 MG/DL (ref 0–30)
WBC # BLD AUTO: 6.6 X10(3) UL (ref 4–11)

## 2025-07-22 PROCEDURE — 99214 OFFICE O/P EST MOD 30 MIN: CPT | Performed by: EMERGENCY MEDICINE

## 2025-07-22 PROCEDURE — 84481 FREE ASSAY (FT-3): CPT

## 2025-07-22 PROCEDURE — 99396 PREV VISIT EST AGE 40-64: CPT | Performed by: EMERGENCY MEDICINE

## 2025-07-22 PROCEDURE — 36415 COLL VENOUS BLD VENIPUNCTURE: CPT

## 2025-07-22 PROCEDURE — 83036 HEMOGLOBIN GLYCOSYLATED A1C: CPT | Performed by: EMERGENCY MEDICINE

## 2025-07-22 PROCEDURE — 84443 ASSAY THYROID STIM HORMONE: CPT

## 2025-07-22 PROCEDURE — 80061 LIPID PANEL: CPT

## 2025-07-22 PROCEDURE — 84439 ASSAY OF FREE THYROXINE: CPT

## 2025-07-22 PROCEDURE — 80053 COMPREHEN METABOLIC PANEL: CPT

## 2025-07-22 PROCEDURE — 85025 COMPLETE CBC W/AUTO DIFF WBC: CPT

## 2025-07-22 RX ORDER — METFORMIN HYDROCHLORIDE 500 MG/1
1000 TABLET, EXTENDED RELEASE ORAL 2 TIMES DAILY WITH MEALS
Qty: 360 TABLET | Refills: 0 | Status: SHIPPED | OUTPATIENT
Start: 2025-07-22 | End: 2025-10-20

## 2025-07-22 RX ORDER — LISINOPRIL 10 MG/1
10 TABLET ORAL DAILY
Qty: 90 TABLET | Refills: 1 | Status: SHIPPED | OUTPATIENT
Start: 2025-07-22

## 2025-07-22 RX ORDER — TIRZEPATIDE 5 MG/.5ML
5 INJECTION, SOLUTION SUBCUTANEOUS WEEKLY
Qty: 2 ML | Refills: 1 | Status: SHIPPED | OUTPATIENT
Start: 2025-08-09

## 2025-07-22 RX ORDER — TIRZEPATIDE 2.5 MG/.5ML
2.5 INJECTION, SOLUTION SUBCUTANEOUS WEEKLY
Qty: 2 ML | Refills: 0 | Status: SHIPPED | OUTPATIENT
Start: 2025-07-22 | End: 2025-08-13

## 2025-07-22 NOTE — PATIENT INSTRUCTIONS
Thank you for choosing [facility]  To Do:  FOR FREDDIE DODD    Follow up with GI re: Colonoscopy and liver enzymes  Arrange for mammogram  Start MOunjaro 2.5  Arrange for Diabetic eye exam Dr Oppenheim  Continue Zetia and all other DM meds  Will consider Repatha if unable to restart Statin, await GI Consult  Follow up in 3 months for recheck        Well balanced diet recommended.    Routine exercise recommended most days during the week.  Wear sunscreen - SPF 15 or higher and reapply every 2 hours as needed.  Wear seat belts and drive safely.  Schedule regular appointments with dentist.  Schedule yearly eye exam if you wear glasses/contacts.  Yearly Flu Vaccine recommended in the fall  Tetanus, Diptheria and Pertussis vaccine should be given every 7-10 years.  Call or come in if there are concerns regarding domestic abuse, sexually transmitted diseases, alcohol/drug addiction, depression/anxiety issues, or any further concerns.        Preventing Osteoporosis: Meeting Your Calcium Needs    Your body needs calcium to build and repair bones. But it can't make calcium on its own. That's why it's important to eat calcium-rich foods. Some foods are naturally rich in calcium. Others have calcium added (fortified). It's best to get calcium from the foods you eat. But if you can't get enough, you may want to take calcium supplements. To meet your daily calcium needs, try the foods listed below.  Dairy Fish & beans Other sources      Source   Calcium (mg) per serving   Source   Calcium (mg) per serving   Source   Calcium (mg) per serving      Low-fat yogurt, plain   415 mg/8 oz.   Sardines, Atlantic, canned, with bones   351 mg/3 oz.   Oatmeal, instant, fortified   215 mg/1 cup   Nonfat milk   302 mg/1 cup   [place], sockeye, canned, with bones   239 mg/3 oz.   Tofu made with calcium sulfate   204 mg/3 oz.   Low-fat milk   297 mg/1 cup   Soybeans, fresh, boiled   131 mg/1/2 cup   Collards   179 mg/1/2 cup    Swiss cheese   272 mg/1 oz.   White beans, cooked   81 mg/1/2 cup   English muffin, whole wheat   175 mg/1 muffin   Cheddar cheese   205 mg/1 oz.   Navy beans, cooked   79 mg/1/2 cup   Kale   90 mg/1/2 cup   Ice cream strawberry   79 mg/1/2 cup           Orange, navel   56 mg/1 medium   Note: Calcium levels may vary depending on brand and size.  Daily calcium needs  14-18 years old: 1,300 mg  19-30 years old: 1,000 mg  31-50 years old: 1,000 mg  51-70 years old, women: 1,200 mg  51-70 years old, men: 1,000 mg  Pregnant or nursin-28 years old: 1,300 mg, 19-50 years old: 1,000 mg  Older than 70 (women and men): 1,200 mg   Date Last Reviewed: 10/17/2015  © 1040-3011 [employer]. [address] [place] [address], [place] [address]. All rights reserved. This information is not intended as a substitute for professional medical care. Always follow your healthcare professional's instructions.        Prevention Guidelines, Women Ages 40 to 49  Screening tests and vaccines are an important part of managing your health. Health counseling is essential, too. Below are guidelines for these, for women ages 40 to 49. Talk with your healthcare provider to make sure you’re up-to-date on what you need.  Screening Who needs it How often   Type 2 diabetes or prediabetes All adults beginning at age 45 and adults without symptoms at any age who are overweight or obese and have 1 or more additional risk factors for diabetes At least every 3 years   Alcohol misuse All women in this age group At routine exams   Blood pressure All women in this age group Every 2 years if your blood pressure is less than 120/80 mm Hg; yearly if your systolic blood pressure is 120 to 139 mm Hg, or your diastolic blood pressure reading is 80 to 89 mm Hg   Breast cancer All women in this age group Yearly mammogram and clinical breast exam2  Each woman should make her own decision. If a woman decides to have mammograms before age 50, they should be done  every 2 years.3   Cervical cancer All women in this age group, except women who have had a complete hysterectomy Pap test every 3 years or Pap test plus human papilloma virus (HPV) test every 5 years   Chlamydia Women at increased risk for infection At routine exams if you're at risk or have symptoms   Depression All women in this age group At routine exams   Gonorrhea Sexually active women at increased risk for infection At routine exams   Hepatitis C Anyone at increased risk; 1 time for those born between 1945 and 1965 At routine exams   High cholesterol or triglycerides All women ages 45 and older who are at risk for coronary artery disease; younger women, talk with your healthcare provider At least every 5 years   HIV All women At routine exams   Obesity All women in this age group At routine exams   Syphilis Women at increased risk for infection - talk with your healthcare provider At routine exams   Tuberculosis Women at increased risk for infection - talk with your healthcare provider Ask your healthcare provider   Vision All women in this age group Complete exam at age 40 and eye exams every 2 to 4 years. If you have a chronic disease, ask your healthcare provider how often you should have your eyes examined.4   Vaccine Who needs it How often   Chickenpox (varicella) All women in this age group who have no record of this infection or vaccine 2 doses; the second dose should be given at least 4 weeks after the first dose   Hepatitis A Women at increased risk for infection - talk with your healthcare provider 2 doses given 6 months apart   Hepatitis B Women at increased risk for infection - talk with your healthcare provider 3 doses over 6 months; second dose should be given 1 month after the first dose; the third dose should be given at least 2 months after the second dose and at least 4 months after the first dose   Haemophilus influenzae Type B (HIB) Women at increased risk 1 to 3 doses   Influenza (flu) All  women in this age group Once a year   Measles, mumps, rubella (MMR) All women in this age group who have no record of these infections or vaccines 1 or 2 doses   Meningococcal Women at increased risk for infection - talk with your healthcare provider 1 or more doses   Pneumococcal conjugate vaccine (PCV13) and pneumococcal polysaccharide vaccine (PPSV23) Women at increased risk for infection - talk with your healthcare provider 1 or 2 doses   Tetanus/diphtheria/pertussis (Td/Tdap) booster All women in this age group A one-time dose of Tdap instead of a Td booster after age 18, then Td every 10 years   Counseling Who needs it How often   BRCA gene mutation testing for breast and ovarian cancer susceptibility Women with increased risk for having gene mutation When your risk is known   Breast cancer and chemoprevention Women at high risk for breast cancer When your risk is known   Diet and exercise Women who are overweight or obese When diagnosed, and then at routine exams   Domestic violence Women at the age in which they are able to have children At routine exams   Sexually transmitted infection prevention Women at increased risk for infection - talk with your healthcare provider At routine exams   Use of tobacco and the health effects it can cause All women in this age group Every exam   1American Diabetes Association  2American Cancer Society  3U.S. Preventive Services Task Force  4[facility] [employer] of [employer]  Date Last Reviewed: 8/27/2015 © 2000-2016 [employer]. [address] [place] [address], [place] [address]. All rights reserved. This information is not intended as a substitute for professional medical care. Always follow your healthcare professional's instructions.

## 2025-07-22 NOTE — TELEPHONE ENCOUNTER
Approved    Prior authorization approved  Payer: [employer] Case ID: [id number]    [phone]    [phone]  Note from payer: CaseId:[id number];Status:Approved;Review Type:Prior Auth;Coverage Start Date:06/22/2025;Coverage End Date:07/22/2026;  Approval Details    Authorized from June 22, 2025 to July 22, 2026  Electronic appeal: Not supported  View History

## 2025-07-22 NOTE — PROGRESS NOTES
Andressa Molina is a 44 year old female who presents for a complete physical exam.   HPI:     Chief Complaint   Patient presents with    Well Adult          Age: 44    1First day of last menstrual period (or first year of         menstruation, if through menopause July 9   2Number of times pregnant: NO              Number of completed pregnancies:  0              Date of last pregnancy:      3. If you are under age 55, what method of birth control do you use? NONE              Are you planning a pregnancy  in the next 6-12 months? YES               If you are through menopause or over age 50, do you take  any of the following pills?                          Calcium NO                        Estrogen (Premarin)              NO                        Progesterone (Provera) NO   4. Have you had any of the following problems:               A.  Abnormal Pap smears  ALL NORMAL               If yes, date:                 problem:                 For abnormality, did you have any of the following done:                    Colposcopy   NO                  Biopsies      NO                  Surgery     NO            B. High blood pressure, heart disease or high cholesterol NO                  D.Abdominal or pelvic surgery or special tests appendectomy   5. Do you have any of the following:      Problems with present method of birth control NA   Bleeding between periods or since periods stopped NO    A new or enlarging lump in breast NO      Change in size/firmness of stools   NO   Change in size/color of a mole  NO   6. Do you have a parent, brother or sister with a history of the following:                  Cancer of the breast, intestine or female organs Colon Cancer in Uncle                Heart pain or heart attacks  before the age of 55 NO  Stroke in uncle in late 50's  Brother with stroke 60 y/o  + fam history of high cholesterol and DM   8. Have you ever used tobacco?     NO   9. Do you drink alcohol?     NO    10. Prevention:         b. Exercise:                              Activity:         c. Do you always wear seat belts?          YES       d. If over 30 years old, have you  had your cholesterol level checked  in the past five years? NO       e. Have you had a tetanus shot  the past 10 years? 2021       f. Does your house have a working smoke detector? YES         g. Do you have firearms at home?           NO        h. Have you ever had a mammogram?  NO       i.  How many sexual partners have you  had in the last 12 months? 1             In your lifetime?       j. When is the last time you had           a dental check-up?  Recent           11. Please describe any concerns you have:             History of Present Illness  Andressa Molina is a 44 year old female with diabetes and hypercholesterolemia who presents for follow-up and medication management.    Glycemic control and gastrointestinal symptoms  - Diabetes managed with metformin, one tablet in the morning and one at night  - Experiences bloating with metformin, but has adjusted over time  - Recent hemoglobin A1c was 7.6%  - Actively reducing rice intake and incorporating exercise into routine  - Family history of diabetes    Hyperlipidemia and medication intolerance  - Takes Zetia for hypercholesterolemia  - Previously discontinued rosuvastatin  - Recent liver enzymes: AST 34, ALT 59  - Previous LDL recorded at 185 mg/dL  - Family history of hypercholesterolemia    Cardiovascular risk factors  - Family history of cardiovascular disease: brother had stroke and myocardial infarction at age 59, uncle had stroke in his fifties    Cancer risk assessment  - Family history of colon cancer: uncle diagnosed with colon cancer  - No changes in bowel movements or moles  - No history of thyroid medullary cancer or multiple endocrine neoplasia    Substance use  - No tobacco or alcohol use       BP Readings from Last 5 Encounters:   07/22/25 114/82   07/07/25 136/90    06/24/25 (!) 146/97   05/15/25 (!) 137/99   05/06/25 114/72         Wt Readings from Last 6 Encounters:   07/22/25 140 lb (63.5 kg)   07/07/25 140 lb 12.8 oz (63.9 kg)   05/15/25 142 lb (64.4 kg)   05/06/25 144 lb (65.3 kg)   02/07/25 146 lb (66.2 kg)   11/04/24 146 lb (66.2 kg)               Patient's last menstrual period was 07/09/2025 (exact date).       Current Medications[1]   Past Medical History[2]   Past Surgical History[3]   Family History[4]   Short Social Hx on File[5]     Current Medications[6]   Past Medical History[7]   Past Surgical History[8]   Family History[9]   Social History:   Short Social Hx on File[10]         REVIEW OF SYSTEMS:   GENERAL HEALTH: feels well, no fatigue.  SKIN: denies any unusual skin lesions or rashes  EYES: no visual complaints or deficits  HEENT: denies nasal congestion, sinus pain or sore throat; hearing loss negative,   RESPIRATORY: denies shortness of breath, wheezing or cough   CARDIOVASCULAR: denies chest pain, SOB, edema,orthopnea, no palpitations   GI: denies nausea, vomiting, constipation, diarrhea; no rectal bleeding; no heartburn  GENITAL/: no dysuria, urgency or frequency  MUSCULOSKELETAL: no joint complaints upper or lower extremities  NEURO: no sensory or motor complaint  HEMATOLOGY: denies hx anemia; denies bruising or excessive bleeding  ENDOCRINE: denies excessive thirst or urination; denies unexpected wt gain or wt loss  ALLERGY/IMM.: denies food or seasonal allergies  PSYCH: no symptoms of depression or anxiety, depression screening negative.      EXAM:   /82   Pulse 88   Resp 16   Ht 5' 2\" (1.575 m)   Wt 140 lb (63.5 kg)   LMP 07/09/2025 (Exact Date)   SpO2 99%   BMI 25.61 kg/m²      General: WD/WN in no acute distress.   HEENT: PERRLA and EOMI.  OP moist no lesions.TM WNL, rufina.Normal ears canals bilaterally.  Neck is supple, with no cervical LAD or thyroid abnormalities. No carotid bruits.    Lungs: are clear to auscultation bilaterally,  with no wheeze, rhonchi, or rales.   Heart: is RRR.  S1, S2, with no murmurs,clicks, gallops  Abdomen: is soft,NBS, NT/ND with no HSM.  No rebound or guarding. No CVA tenderness, no hernias.   exam: deferred  Neuro: Cranial nerves II-XII normal,no focal abnormalities, and reflexes coordination and gait normal and symmetric.Sensation intact.  Extremities: are symmetric with no cyanosis, clubbing, or edema.  MS: Normal muscles tones, no joints abnormalities.  SKIN: Normal color, turgor, no lesions, rashes or wounds.  PSYCH: normal affect and mood.      ASSESSMENT AND PLAN:           1. Encounter for annual physical examination excluding gynecological examination in a patient older than 17 years    2. Controlled type 2 diabetes mellitus without complication, without long-term current use of insulin ([facility])  - POC Hemoglobin A1C  - Ophthalmology Referral - In Network  - metFORMIN  MG Oral Tablet 24 Hr; Take 2 tablets (1,000 mg total) by mouth 2 (two) times daily with meals.  Dispense: 360 tablet; Refill: 0  - Tirzepatide (MOUNJARO) 5 MG/0.5ML Subcutaneous Solution Auto-injector; Inject 5 mg into the skin once a week. Start after completing 2.5 mg weekly x 4 weeks  Dispense: 2 mL; Refill: 1  - Tirzepatide (MOUNJARO) 2.5 MG/0.5ML Subcutaneous Solution Auto-injector; Inject 2.5 mg into the skin once a week for 4 doses.  Dispense: 2 mL; Refill: 0    3. Elevated liver enzymes  - Comp Metabolic Panel (14); Future  - GASTRO - INTERNAL    4. Familial hypercholesteremia  - Comp Metabolic Panel (14); Future  - Lipid Panel; Future    5. Hypertension associated with diabetes (HCC)  - lisinopril 10 MG Oral Tab; Take 1 tablet (10 mg total) by mouth daily.  Dispense: 90 tablet; Refill: 1    6. Laboratory examination ordered as part of a routine general medical examination  - CBC With Differential With Platelet; Future  - Comp Metabolic Panel (14); Future  - Lipid Panel; Future  - TSH W Reflex To Free T4; Future    7. Screening  for colon cancer  - GASTRO - INTERNAL    8. Encounter for screening mammogram for breast cancer  - [facility] 2D+3D SCREENING BILAT (CPT=77067/46523); Future          Andressa Molina is a 44 year old female who presents for a complete physical exam.Gyn exam and Pap smear UTD.  Self breast exams advised.  The patient should schedule annual mammograms beginning at the age of 40.    Counseled on fat diet and aerobic exercise 30 minutes three times weekly.   Counseled on maintaining a healthy weight and healthy BMI  Health maintenance.   Immunizations reviewed and updated  TDAP UTD  The patient indicates understanding of these issues and agrees to the plan.  The patient is asked  to return yearly for annual preventative health exam.    Well balanced diet recommended.    Routine exercise recommended most days during the week.  Wear sunscreen - SPF 15 or higher and reapply every 2 hours as needed.  Wear seat belts and drive safely.  Schedule regular appointments with dentist.  Schedule yearly eye exam if you wear glasses/contacts.  Yearly Flu Vaccine recommended.  Tetanus, Diptheria and Pertussis vaccine should be given every 7-10 years.  Call or come in if there are concerns regarding domestic abuse, sexually transmitted diseases, alcohol/drug addiction, depression/anxiety issues, or any further concerns.        Assessment & Plan  Diabetes Mellitus Type 2  - Patient unable to tolerate higher doses of metformin.  Only able to take 500 mg twice a day because of stomach upset.  Diabetes suboptimally controlled and may benefit from further glycemic control.  HbA1c at 7.6%, above target of <7 Discussed adding GLP-1 receptor agonist Mounjaro for better glycemic control and weight loss. Consider Ozempic if Mounjaro not covered by insurance.  - Start Mounjaro 2.5 mg weekly for one month, then increase to 5 mg weekly.  - Monitor HbA1c and adjust treatment as needed.  - Consider alternative medications if Mounjaro is not covered  by insurance.    Patient has no family or personal  history of  multiple endocrine neoplasia or family or personal history of medullary thyroid cancer.  Pt reports no history of pancreatitis    Discussed side effects such as nausea easy satiety vomiting.  Encouraged to continue even if with symptoms call if there is any severe symptoms.  Discussed risk of pancreatitis. Also discussed rare incidence of medullary thyroid cancer with GLP 1 agonist use.  Patient understands risks and benefits and wished to continue with Mounjaro      Hyperlipidemia  Family history of hyperlipidemia. Emphasized LDL target below 70 mg/dL. Discussed Repatha if statins not tolerated.  - Continue ezetimibe (Zetia) for now  - Reassess need for rosuvastatin based on liver enzyme levels.  Also await GI consult re elevated liver enzymes and statin therapy  - Consider Repatha if unable to restart rosuvastatin.  - Order lipid panel to assess current cholesterol levels.    Hypertension  Well controlled with Lisinopril    General Health Maintenance  Due for routine screenings and vaccinations. Discussed colonoscopy options.  - Discuss colonoscopy with GI on August 4th.  - Arrange for a mammogram.  - Schedule diabetic eye exam.  - Continue routine dental visits every six months.      PATIENT INSTRUCTIONS:    Follow up with GI re: Colonoscopy and liver enzymes  Arrange for mammogram  Start MOunjaro 2.5  Arrange for Diabetic eye exam Dr Oppenheim  Continue Zetia and all other DM meds  Will consider Repatha if unable to restart Statin, await GI Consult  Follow up in 3 months for recheck      FOLLOW UP:    3 months                 [1]   Current Outpatient Medications   Medication Sig Dispense Refill    lisinopril 10 MG Oral Tab Take 1 tablet (10 mg total) by mouth daily. 90 tablet 1    metFORMIN  MG Oral Tablet 24 Hr Take 2 tablets (1,000 mg total) by mouth 2 (two) times daily with meals. 360 tablet 0    [START ON 8/9/2025] Tirzepatide (MOUNJARO) 5  MG/0.5ML Subcutaneous Solution Auto-injector Inject 5 mg into the skin once a week. Start after completing 2.5 mg weekly x 4 weeks 2 mL 1    Tirzepatide (MOUNJARO) 2.5 MG/0.5ML Subcutaneous Solution Auto-injector Inject 2.5 mg into the skin once a week for 4 doses. 2 mL 0    ezetimibe (ZETIA) 10 MG Oral Tab Take 1 tablet (10 mg total) by mouth daily. 90 tablet 1    Omeprazole 40 MG Oral Capsule Delayed Release TAKE 1 CAPSULE BY MOUTH BEFORE BREAKFAST. 30 capsule 0    Blood Glucose Monitoring Suppl (ONETOUCH VERIO) w/Device Does not apply Kit 1 Units daily. Check blood sugars once a day 1 kit 0    Glucose Blood (ONETOUCH VERIO) In Vitro Strip Check blood sugars once a day 300 strip 1    OneTouch Delica Lancets 33G Does not apply Misc 1 Lancet by Finger stick route daily. 300 each 1    Multiple Vitamins-Minerals (MULTIVITAMIN ADULT, MINERALS,) Oral Tab      [2]   Past Medical History:   Allergic rhinitis    Esophageal reflux    Heartburn    High cholesterol    Hyperlipidemia    Indigestion    Lateral epicondylitis, right elbow    Problems with swallowing    Thyroid cyst    had benign biopsy    Uncomfortable fullness after meals   [3]   Past Surgical History:  Procedure Laterality Date    Appendectomy  22   [4]   Family History  Problem Relation Age of Onset    Diabetes Father             Hypertension Father     Diabetes Sister     Diabetes Brother     Diabetes Brother     Stroke Brother     Diabetes Brother    [5]   Social History  Socioeconomic History    Marital status:    Tobacco Use    Smoking status: Never     Passive exposure: Never    Smokeless tobacco: Never   Vaping Use    Vaping status: Never Used   Substance and Sexual Activity    Alcohol use: No    Drug use: No    Sexual activity: Yes     Partners: Male   Other Topics Concern    Caffeine Concern No    Exercise No    Seat Belt No    Special Diet No    Stress Concern No    Weight Concern No     Social Drivers of Health     Food  Insecurity: No Food Insecurity (5/6/2025)    NCSS - Food Insecurity     Worried About Running Out of Food in the Last Year: No     Ran Out of Food in the Last Year: No   Transportation Needs: No Transportation Needs (5/6/2025)    NCSS - Transportation     Lack of Transportation: No   Housing Stability: Not At Risk (5/6/2025)    NCSS - Housing/Utilities     Has Housing: Yes     Worried About Losing Housing: No     Unable to Get Utilities: No   [6]   Current Outpatient Medications   Medication Sig Dispense Refill    lisinopril 10 MG Oral Tab Take 1 tablet (10 mg total) by mouth daily. 90 tablet 1    metFORMIN  MG Oral Tablet 24 Hr Take 2 tablets (1,000 mg total) by mouth 2 (two) times daily with meals. 360 tablet 0    [START ON 8/9/2025] Tirzepatide (MOUNJARO) 5 MG/0.5ML Subcutaneous Solution Auto-injector Inject 5 mg into the skin once a week. Start after completing 2.5 mg weekly x 4 weeks 2 mL 1    Tirzepatide (MOUNJARO) 2.5 MG/0.5ML Subcutaneous Solution Auto-injector Inject 2.5 mg into the skin once a week for 4 doses. 2 mL 0    ezetimibe (ZETIA) 10 MG Oral Tab Take 1 tablet (10 mg total) by mouth daily. 90 tablet 1    Omeprazole 40 MG Oral Capsule Delayed Release TAKE 1 CAPSULE BY MOUTH BEFORE BREAKFAST. 30 capsule 0    Blood Glucose Monitoring Suppl (ONETOUCH VERIO) w/Device Does not apply Kit 1 Units daily. Check blood sugars once a day 1 kit 0    Glucose Blood (ONETOUCH VERIO) In Vitro Strip Check blood sugars once a day 300 strip 1    OneTouch Delica Lancets 33G Does not apply Misc 1 Lancet by Finger stick route daily. 300 each 1    Multiple Vitamins-Minerals (MULTIVITAMIN ADULT, MINERALS,) Oral Tab      [7]   Past Medical History:   Allergic rhinitis    Esophageal reflux    Heartburn    High cholesterol    Hyperlipidemia    Indigestion    Lateral epicondylitis, right elbow    Problems with swallowing    Thyroid cyst    had benign biopsy    Uncomfortable fullness after meals   [8]   Past Surgical  History:  Procedure Laterality Date    Appendectomy  22   [9]   Family History  Problem Relation Age of Onset    Diabetes Father             Hypertension Father     Diabetes Sister     Diabetes Brother     Diabetes Brother     Stroke Brother     Diabetes Brother    [10]   Social History  Socioeconomic History    Marital status:    Tobacco Use    Smoking status: Never     Passive exposure: Never    Smokeless tobacco: Never   Vaping Use    Vaping status: Never Used   Substance and Sexual Activity    Alcohol use: No    Drug use: No    Sexual activity: Yes     Partners: Male   Other Topics Concern    Caffeine Concern No    Exercise No    Seat Belt No    Special Diet No    Stress Concern No    Weight Concern No     Social Drivers of Health     Food Insecurity: No Food Insecurity (2025)    NCSS - Food Insecurity     Worried About Running Out of Food in the Last Year: No     Ran Out of Food in the Last Year: No   Transportation Needs: No Transportation Needs (2025)    NCSS - Transportation     Lack of Transportation: No   Housing Stability: Not At Risk (2025)    NCSS - Housing/Utilities     Has Housing: Yes     Worried About Losing Housing: No     Unable to Get Utilities: No

## 2025-08-05 ENCOUNTER — PATIENT MESSAGE (OUTPATIENT)
Dept: FAMILY MEDICINE CLINIC | Facility: CLINIC | Age: 45
End: 2025-08-05

## 2025-08-05 DIAGNOSIS — E11.9 CONTROLLED TYPE 2 DIABETES MELLITUS WITHOUT COMPLICATION, WITHOUT LONG-TERM CURRENT USE OF INSULIN (HCC): ICD-10-CM

## 2025-08-06 RX ORDER — TIRZEPATIDE 2.5 MG/.5ML
2.5 INJECTION, SOLUTION SUBCUTANEOUS WEEKLY
Qty: 2 ML | Refills: 0 | Status: SHIPPED | OUTPATIENT
Start: 2025-08-06 | End: 2025-08-28

## 2025-08-09 ENCOUNTER — LAB ENCOUNTER (OUTPATIENT)
Dept: LAB | Age: 45
End: 2025-08-09
Attending: EMERGENCY MEDICINE

## 2025-08-09 DIAGNOSIS — R79.89 ABNORMAL LIVER FUNCTION TESTS: ICD-10-CM

## 2025-08-09 LAB
ALBUMIN SERPL-MCNC: 4.7 G/DL (ref 3.2–4.8)
ALP LIVER SERPL-CCNC: 78 U/L (ref 37–98)
ALT SERPL-CCNC: 43 U/L (ref 10–49)
AST SERPL-CCNC: 34 U/L (ref ?–34)
BILIRUB DIRECT SERPL-MCNC: <0.1 MG/DL (ref ?–0.3)
BILIRUB SERPL-MCNC: 0.3 MG/DL (ref 0.3–1.2)
PROT SERPL-MCNC: 7.7 G/DL (ref 5.7–8.2)

## 2025-08-09 PROCEDURE — 36415 COLL VENOUS BLD VENIPUNCTURE: CPT

## 2025-08-09 PROCEDURE — 80076 HEPATIC FUNCTION PANEL: CPT

## 2025-08-09 PROCEDURE — 82728 ASSAY OF FERRITIN: CPT

## 2025-08-10 LAB — DEPRECATED HBV CORE AB SER IA-ACNC: 11 NG/ML (ref 50–306)

## 2025-08-19 ENCOUNTER — LAB ENCOUNTER (OUTPATIENT)
Dept: LAB | Age: 45
End: 2025-08-19
Attending: INTERNAL MEDICINE

## 2025-08-19 DIAGNOSIS — R79.89 ABNORMAL LIVER FUNCTION TESTS: ICD-10-CM

## 2025-08-19 LAB
HAV IGM SER QL: NONREACTIVE
HBV CORE IGM SER QL: NONREACTIVE
HBV SURFACE AB SER QL: REACTIVE
HBV SURFACE AB SERPL IA-ACNC: 30.79 MIU/ML
HBV SURFACE AG SERPL QL IA: NONREACTIVE
HCV AB SERPL QL IA: NONREACTIVE
IRON SATN MFR SERPL: 12 % (ref 15–50)
IRON SERPL-MCNC: 46 UG/DL (ref 50–170)
TOTAL IRON BINDING CAPACITY: 400 UG/DL (ref 250–425)
TRANSFERRIN SERPL-MCNC: 335 MG/DL (ref 250–380)

## 2025-08-19 PROCEDURE — 36415 COLL VENOUS BLD VENIPUNCTURE: CPT

## 2025-08-19 PROCEDURE — 83516 IMMUNOASSAY NONANTIBODY: CPT

## 2025-08-19 PROCEDURE — 82103 ALPHA-1-ANTITRYPSIN TOTAL: CPT

## 2025-08-19 PROCEDURE — 86706 HEP B SURFACE ANTIBODY: CPT

## 2025-08-19 PROCEDURE — 83540 ASSAY OF IRON: CPT

## 2025-08-19 PROCEDURE — 80074 ACUTE HEPATITIS PANEL: CPT

## 2025-08-19 PROCEDURE — 83550 IRON BINDING TEST: CPT

## 2025-08-20 ENCOUNTER — HOSPITAL ENCOUNTER (OUTPATIENT)
Dept: MAMMOGRAPHY | Age: 45
Discharge: HOME | End: 2025-08-20
Attending: EMERGENCY MEDICINE

## 2025-08-20 DIAGNOSIS — Z12.31 ENCOUNTER FOR SCREENING MAMMOGRAM FOR BREAST CANCER: ICD-10-CM

## 2025-08-20 LAB
A-1-ANTITRYPSIN: 113 MG/DL
ACTIN SMOOTH MUSCLE AB: 4 UNITS
M2 MITOCHONDRIAL AB: <20 UNITS

## 2025-08-20 PROCEDURE — 77067 SCR MAMMO BI INCL CAD: CPT | Performed by: EMERGENCY MEDICINE

## 2025-08-20 PROCEDURE — 77063 BREAST TOMOSYNTHESIS BI: CPT | Performed by: EMERGENCY MEDICINE

## 2025-08-24 ENCOUNTER — RESULTS FOLLOW-UP (OUTPATIENT)
Dept: FAMILY MEDICINE CLINIC | Facility: CLINIC | Age: 45
End: 2025-08-24

## (undated) DEVICE — PENCIL TELESCOPE MEGADYNE SE

## (undated) DEVICE — #11 STERILE BLADE: Brand: POLYMER COATED BLADES

## (undated) DEVICE — TROCAR: Brand: KII® SLEEVE

## (undated) DEVICE — CLOSURE EXOFIN 1.0ML

## (undated) DEVICE — ECHELON 3000 45 STANDARD: Brand: ECHELON

## (undated) DEVICE — TRAY SURESTEP 16 BARDEX UMETR

## (undated) DEVICE — SUT MONOCRYL 4-0 PS-2 Y496G

## (undated) DEVICE — SUT PDS II 0 CT-2 Z334H

## (undated) DEVICE — SOL NACL IRRIG 0.9% 1000ML BTL

## (undated) DEVICE — STERILE POLYISOPRENE POWDER-FREE SURGICAL GLOVES: Brand: PROTEXIS

## (undated) DEVICE — ENDOPATH ULTRA VERESS INSUFFLATION NEEDLES WITH LUER LOCK CONNECTORS: Brand: ENDOPATH

## (undated) DEVICE — TROCAR: Brand: KII SHIELDED BLADED ACCESS SYSTEM

## (undated) DEVICE — LAPAROVUE VISIBILITY SYSTEM LAPAROSCOPIC SOLUTIONS: Brand: LAPAROVUE

## (undated) DEVICE — MEGADYNE ELECTRODE ADULT PT RT

## (undated) DEVICE — LIGHT HANDLE

## (undated) DEVICE — THE ECHELON, ECHELON ENDOPATH™ AND ECHELON FLEX™ FAMILIES OF ENDOSCOPIC LINEAR CUTTERS AND RELOADS ARE STERILE, SINGLE PATIENT USE INSTRUMENTS THAT SIMULTANEOUSLY CUT AND STAPLE TISSUE. THERE ARE SIX STAGGERED ROWS OF STAPLES, THREE ON EITHER SIDE OF THE CUT LINE. THE 45 MM INSTRUMENTS HAVE A STAPLE LINE THATIS APPROXIMATELY 45 MM LONG AND A CUT LINE THAT IS APPROXIMATELY 42 MM LONG. THE SHAFT CAN ROTATE FREELY IN BOTH DIRECTIONS AND AN ARTICULATION MECHANISM ON ARTICULATING INSTRUMENTS ENABLES BENDING THE DISTAL PORTIONOF THE SHAFT TO FACILITATE LATERAL ACCESS OF THE OPERATIVE SITE.THE INSTRUMENTS ARE SHIPPED WITHOUT A RELOAD AND MUST BE LOADED PRIOR TO USE. A STAPLE RETAINING CAP ON THE RELOAD PROTECTS THE STAPLE LEG POINTS DURING SHIPPING AND TRANSPORTATION. THE INSTRUMENTS’ LOCK-OUT FEATURE IS DESIGNED TO PREVENT A USED RELOAD FROM BEING REFIRED.: Brand: ECHELON ENDOPATH

## (undated) DEVICE — POUCH SPECIMEN WIRE 6X3 250ML

## (undated) DEVICE — 40580 - THE PINK PAD - ADVANCED TRENDELENBURG POSITIONING KIT: Brand: 40580 - THE PINK PAD - ADVANCED TRENDELENBURG POSITIONING KIT

## (undated) DEVICE — #15 STERILE STAINLESS BLADE: Brand: STERILE STAINLESS BLADES

## (undated) DEVICE — SLEEVE KENDALL SCD EXPRESS MED

## (undated) DEVICE — APPLICATOR CHLORAPREP 26ML

## (undated) DEVICE — ENSEAL X1 TISSUE SEALER, CURVED JAW, 37 CM SHAFT LENGTH: Brand: ENSEAL

## (undated) DEVICE — LAP CHOLE/APPY CDS-LF: Brand: MEDLINE INDUSTRIES, INC.

## (undated) DEVICE — TROCAR: Brand: KII FIOS FIRST ENTRY

## (undated) NOTE — LETTER
Date: 5/16/2025    Patient Name: Andressa Molina          To Whom it may concern:    This letter has been written at the patient's request. The above patient was seen at [facility] for treatment of a medical condition.        Please excuse from missed days of work due to illness, 5/16/25.              Sincerely,        Jeaneth Queen MD

## (undated) NOTE — LETTER
06/10/21        Evelio Molina  [address] [address]      Dear Rahul Jenkins records indicate that you have outstanding lab work and or testing that was ordered for you and has not yet been completed:        ANABELLA BETH (CP

## (undated) NOTE — LETTER
Date: 2/28/2024    Patient Name: Andressa Molina          To Whom it may concern:    This letter has been written at the patient's request. The above patient was seen at the [facility] for treatment of a medical condition.    This patient should be excused from attending work/school today due to medical reasons.         Sincerely,    Seema Babin PA-C

## (undated) NOTE — LETTER
25    Patient: Andressa Molina  : 1980 Visit date: 2025    Dear  Jeaneth Queen MD    Thank you for referring Andressa Molina to my practice.  Please find my assessment and plan below.     Assessment   1. Gallbladder sludge          Plan     Assessment & Plan  Elevated liver enzymes  Elevated liver enzymes likely related to rosuvastatin. Ultrasound showed fatty liver and gallbladder sludge without gallbladder disease symptoms. Gallbladder sludge does not require surgery. Discussed gallstone prevalence and symptom monitoring.  - Follow up with primary care for liver enzyme monitoring.  - Consult gastroenterologist for liver enzyme and fatty liver evaluation.    Fatty liver  Fatty liver on ultrasound, likely due to type 2 diabetes and medication. No surgical symptoms. Explained fatty liver from hyperglycemia and hepatic steatosis.  - Consult gastroenterologist for fatty liver management.  - Adopt diet with controlled carbohydrates, increased protein, and healthy fats.    Type 2 diabetes mellitus  Type 2 diabetes may cause fatty liver via hyperglycemia and hepatic steatosis.  - Adopt carbohydrate-controlled diet with emphasis on protein and healthy fats.          Sincerely,       Bird Kramer MD   CC: No Recipients